# Patient Record
Sex: FEMALE | Race: WHITE | NOT HISPANIC OR LATINO | URBAN - METROPOLITAN AREA
[De-identification: names, ages, dates, MRNs, and addresses within clinical notes are randomized per-mention and may not be internally consistent; named-entity substitution may affect disease eponyms.]

---

## 2019-08-06 ENCOUNTER — OUTPATIENT (OUTPATIENT)
Dept: OUTPATIENT SERVICES | Facility: HOSPITAL | Age: 29
LOS: 1 days | End: 2019-08-06

## 2019-08-06 VITALS
RESPIRATION RATE: 16 BRPM | SYSTOLIC BLOOD PRESSURE: 110 MMHG | TEMPERATURE: 97 F | HEART RATE: 64 BPM | WEIGHT: 182.1 LBS | HEIGHT: 63 IN | DIASTOLIC BLOOD PRESSURE: 70 MMHG

## 2019-08-06 DIAGNOSIS — Z98.891 HISTORY OF UTERINE SCAR FROM PREVIOUS SURGERY: ICD-10-CM

## 2019-08-06 DIAGNOSIS — Z01.818 ENCOUNTER FOR OTHER PREPROCEDURAL EXAMINATION: ICD-10-CM

## 2019-08-06 DIAGNOSIS — O34.219 MATERNAL CARE FOR UNSPECIFIED TYPE SCAR FROM PREVIOUS CESAREAN DELIVERY: ICD-10-CM

## 2019-08-06 DIAGNOSIS — Z98.891 HISTORY OF UTERINE SCAR FROM PREVIOUS SURGERY: Chronic | ICD-10-CM

## 2019-08-06 LAB
APPEARANCE UR: SIGNIFICANT CHANGE UP
BACTERIA # UR AUTO: HIGH
BILIRUB UR-MCNC: NEGATIVE — SIGNIFICANT CHANGE UP
BLOOD UR QL VISUAL: NEGATIVE — SIGNIFICANT CHANGE UP
COLOR SPEC: YELLOW — SIGNIFICANT CHANGE UP
GLUCOSE UR-MCNC: NEGATIVE — SIGNIFICANT CHANGE UP
HCT VFR BLD CALC: 35.2 % — SIGNIFICANT CHANGE UP (ref 34.5–45)
HGB BLD-MCNC: 11.4 G/DL — LOW (ref 11.5–15.5)
HYALINE CASTS # UR AUTO: NEGATIVE — SIGNIFICANT CHANGE UP
KETONES UR-MCNC: NEGATIVE — SIGNIFICANT CHANGE UP
LEUKOCYTE ESTERASE UR-ACNC: NEGATIVE — SIGNIFICANT CHANGE UP
MCHC RBC-ENTMCNC: 29.7 PG — SIGNIFICANT CHANGE UP (ref 27–34)
MCHC RBC-ENTMCNC: 32.4 % — SIGNIFICANT CHANGE UP (ref 32–36)
MCV RBC AUTO: 91.7 FL — SIGNIFICANT CHANGE UP (ref 80–100)
NITRITE UR-MCNC: NEGATIVE — SIGNIFICANT CHANGE UP
NRBC # FLD: 0 K/UL — SIGNIFICANT CHANGE UP (ref 0–0)
PH UR: 6.5 — SIGNIFICANT CHANGE UP (ref 5–8)
PLATELET # BLD AUTO: 143 K/UL — LOW (ref 150–400)
PMV BLD: 11.7 FL — SIGNIFICANT CHANGE UP (ref 7–13)
PROT UR-MCNC: 20 — SIGNIFICANT CHANGE UP
RBC # BLD: 3.84 M/UL — SIGNIFICANT CHANGE UP (ref 3.8–5.2)
RBC # FLD: 13.5 % — SIGNIFICANT CHANGE UP (ref 10.3–14.5)
RBC CASTS # UR COMP ASSIST: SIGNIFICANT CHANGE UP (ref 0–?)
SP GR SPEC: 1.02 — SIGNIFICANT CHANGE UP (ref 1–1.04)
SQUAMOUS # UR AUTO: SIGNIFICANT CHANGE UP
UROBILINOGEN FLD QL: NORMAL — SIGNIFICANT CHANGE UP
WBC # BLD: 8.74 K/UL — SIGNIFICANT CHANGE UP (ref 3.8–10.5)
WBC # FLD AUTO: 8.74 K/UL — SIGNIFICANT CHANGE UP (ref 3.8–10.5)
WBC UR QL: SIGNIFICANT CHANGE UP (ref 0–?)

## 2019-08-06 NOTE — OB PST NOTE - HISTORY OF PRESENT ILLNESS
30 y/o female , last 2 deliveries where by . The first  was performed due to fetal heart decelerations  during labor. The 2nd  was performed as pt did not dilate.  Now with EDC 19, scheduled for Repeat  19. 30 y/o female , last 2 deliveries where by . The first  was performed due to fetal heart decelerations  during labor. The 2nd  was performed as pt did not dilate.  Now with EDC 19, scheduled for Repeat  19.     Pt reports she feels baby moving as usual today

## 2019-08-06 NOTE — OB PST NOTE - NSHPREVIEWOFSYSTEMS_GEN_ALL_CORE

## 2019-08-06 NOTE — OB PST NOTE - ASSESSMENT
30 y/o female , last 2 deliveries where by . The first  was performed due to fetal heart decelerations  during labor. The 2nd  was performed as pt did not dilate.  Now with EDC 19, scheduled for Repeat  19.

## 2019-08-06 NOTE — OB PST NOTE - PROBLEM SELECTOR PLAN 1
Repeat  19.     CBC RPR T&S UA    Preop instructions and antibacterial soap given and explained (verbal and written), with teach back.

## 2019-08-07 ENCOUNTER — TRANSCRIPTION ENCOUNTER (OUTPATIENT)
Age: 29
End: 2019-08-07

## 2019-08-07 LAB — T PALLIDUM AB TITR SER: NEGATIVE — SIGNIFICANT CHANGE UP

## 2019-08-08 ENCOUNTER — INPATIENT (INPATIENT)
Facility: HOSPITAL | Age: 29
LOS: 2 days | Discharge: HOME | End: 2019-08-11
Attending: OBSTETRICS & GYNECOLOGY | Admitting: OBSTETRICS & GYNECOLOGY
Payer: COMMERCIAL

## 2019-08-08 ENCOUNTER — TRANSCRIPTION ENCOUNTER (OUTPATIENT)
Age: 29
End: 2019-08-08

## 2019-08-08 VITALS
DIASTOLIC BLOOD PRESSURE: 57 MMHG | HEART RATE: 66 BPM | RESPIRATION RATE: 17 BRPM | SYSTOLIC BLOOD PRESSURE: 107 MMHG | TEMPERATURE: 98 F

## 2019-08-08 DIAGNOSIS — Z98.891 HISTORY OF UTERINE SCAR FROM PREVIOUS SURGERY: Chronic | ICD-10-CM

## 2019-08-08 DIAGNOSIS — Z3A.00 WEEKS OF GESTATION OF PREGNANCY NOT SPECIFIED: ICD-10-CM

## 2019-08-08 DIAGNOSIS — O26.899 OTHER SPECIFIED PREGNANCY RELATED CONDITIONS, UNSPECIFIED TRIMESTER: ICD-10-CM

## 2019-08-08 LAB
BASOPHILS # BLD AUTO: 0.02 K/UL — SIGNIFICANT CHANGE UP (ref 0–0.2)
BASOPHILS NFR BLD AUTO: 0.2 % — SIGNIFICANT CHANGE UP (ref 0–2)
BLD GP AB SCN SERPL QL: NEGATIVE — SIGNIFICANT CHANGE UP
EOSINOPHIL # BLD AUTO: 0.07 K/UL — SIGNIFICANT CHANGE UP (ref 0–0.5)
EOSINOPHIL NFR BLD AUTO: 0.8 % — SIGNIFICANT CHANGE UP (ref 0–6)
HCT VFR BLD CALC: 34.2 % — LOW (ref 34.5–45)
HGB BLD-MCNC: 11.2 G/DL — LOW (ref 11.5–15.5)
IMM GRANULOCYTES NFR BLD AUTO: 0.5 % — SIGNIFICANT CHANGE UP (ref 0–1.5)
LYMPHOCYTES # BLD AUTO: 1.67 K/UL — SIGNIFICANT CHANGE UP (ref 1–3.3)
LYMPHOCYTES # BLD AUTO: 18.1 % — SIGNIFICANT CHANGE UP (ref 13–44)
MCHC RBC-ENTMCNC: 29.9 PG — SIGNIFICANT CHANGE UP (ref 27–34)
MCHC RBC-ENTMCNC: 32.7 % — SIGNIFICANT CHANGE UP (ref 32–36)
MCV RBC AUTO: 91.2 FL — SIGNIFICANT CHANGE UP (ref 80–100)
MONOCYTES # BLD AUTO: 0.65 K/UL — SIGNIFICANT CHANGE UP (ref 0–0.9)
MONOCYTES NFR BLD AUTO: 7 % — SIGNIFICANT CHANGE UP (ref 2–14)
NEUTROPHILS # BLD AUTO: 6.77 K/UL — SIGNIFICANT CHANGE UP (ref 1.8–7.4)
NEUTROPHILS NFR BLD AUTO: 73.4 % — SIGNIFICANT CHANGE UP (ref 43–77)
NRBC # FLD: 0 K/UL — SIGNIFICANT CHANGE UP (ref 0–0)
PLATELET # BLD AUTO: 148 K/UL — LOW (ref 150–400)
PMV BLD: 11.6 FL — SIGNIFICANT CHANGE UP (ref 7–13)
RBC # BLD: 3.75 M/UL — LOW (ref 3.8–5.2)
RBC # FLD: 13.3 % — SIGNIFICANT CHANGE UP (ref 10.3–14.5)
RH IG SCN BLD-IMP: POSITIVE — SIGNIFICANT CHANGE UP
RH IG SCN BLD-IMP: POSITIVE — SIGNIFICANT CHANGE UP
WBC # BLD: 9.23 K/UL — SIGNIFICANT CHANGE UP (ref 3.8–10.5)
WBC # FLD AUTO: 9.23 K/UL — SIGNIFICANT CHANGE UP (ref 3.8–10.5)

## 2019-08-08 PROCEDURE — 59514 CESAREAN DELIVERY ONLY: CPT | Mod: U7

## 2019-08-08 RX ORDER — NALOXONE HYDROCHLORIDE 4 MG/.1ML
0.1 SPRAY NASAL
Refills: 0 | Status: DISCONTINUED | OUTPATIENT
Start: 2019-08-09 | End: 2019-08-10

## 2019-08-08 RX ORDER — SODIUM CHLORIDE 9 MG/ML
1000 INJECTION, SOLUTION INTRAVENOUS
Refills: 0 | Status: DISCONTINUED | OUTPATIENT
Start: 2019-08-08 | End: 2019-08-08

## 2019-08-08 RX ORDER — OXYCODONE HYDROCHLORIDE 5 MG/1
5 TABLET ORAL
Refills: 0 | Status: DISCONTINUED | OUTPATIENT
Start: 2019-08-09 | End: 2019-08-10

## 2019-08-08 RX ORDER — CITRIC ACID/SODIUM CITRATE 300-500 MG
30 SOLUTION, ORAL ORAL ONCE
Refills: 0 | Status: COMPLETED | OUTPATIENT
Start: 2019-08-08 | End: 2019-08-08

## 2019-08-08 RX ORDER — OXYTOCIN 10 UNIT/ML
333.33 VIAL (ML) INJECTION
Qty: 20 | Refills: 0 | Status: DISCONTINUED | OUTPATIENT
Start: 2019-08-08 | End: 2019-08-08

## 2019-08-08 RX ORDER — BUTORPHANOL TARTRATE 2 MG/ML
0.12 INJECTION, SOLUTION INTRAMUSCULAR; INTRAVENOUS EVERY 6 HOURS
Refills: 0 | Status: DISCONTINUED | OUTPATIENT
Start: 2019-08-09 | End: 2019-08-10

## 2019-08-08 RX ORDER — TRANEXAMIC ACID 100 MG/ML
1000 INJECTION, SOLUTION INTRAVENOUS ONCE
Refills: 0 | Status: COMPLETED | OUTPATIENT
Start: 2019-08-08 | End: 2019-08-08

## 2019-08-08 RX ORDER — ONDANSETRON 8 MG/1
4 TABLET, FILM COATED ORAL EVERY 6 HOURS
Refills: 0 | Status: DISCONTINUED | OUTPATIENT
Start: 2019-08-09 | End: 2019-08-10

## 2019-08-08 RX ORDER — METOCLOPRAMIDE HCL 10 MG
10 TABLET ORAL ONCE
Refills: 0 | Status: COMPLETED | OUTPATIENT
Start: 2019-08-08 | End: 2019-08-08

## 2019-08-08 RX ORDER — OXYCODONE HYDROCHLORIDE 5 MG/1
10 TABLET ORAL
Refills: 0 | Status: DISCONTINUED | OUTPATIENT
Start: 2019-08-09 | End: 2019-08-10

## 2019-08-08 RX ORDER — HYDROMORPHONE HYDROCHLORIDE 2 MG/ML
1 INJECTION INTRAMUSCULAR; INTRAVENOUS; SUBCUTANEOUS ONCE
Refills: 0 | Status: DISCONTINUED | OUTPATIENT
Start: 2019-08-09 | End: 2019-08-10

## 2019-08-08 RX ORDER — FAMOTIDINE 10 MG/ML
20 INJECTION INTRAVENOUS ONCE
Refills: 0 | Status: COMPLETED | OUTPATIENT
Start: 2019-08-08 | End: 2019-08-08

## 2019-08-08 RX ORDER — SODIUM CHLORIDE 9 MG/ML
1000 INJECTION, SOLUTION INTRAVENOUS ONCE
Refills: 0 | Status: COMPLETED | OUTPATIENT
Start: 2019-08-08 | End: 2019-08-08

## 2019-08-08 RX ADMIN — Medication 10 MILLIGRAM(S): at 22:00

## 2019-08-08 RX ADMIN — SODIUM CHLORIDE 2000 MILLILITER(S): 9 INJECTION, SOLUTION INTRAVENOUS at 21:30

## 2019-08-08 RX ADMIN — FAMOTIDINE 20 MILLIGRAM(S): 10 INJECTION INTRAVENOUS at 22:00

## 2019-08-08 RX ADMIN — TRANEXAMIC ACID 220 MILLIGRAM(S): 100 INJECTION, SOLUTION INTRAVENOUS at 22:11

## 2019-08-08 RX ADMIN — Medication 30 MILLILITER(S): at 22:00

## 2019-08-08 NOTE — OB PROVIDER TRIAGE NOTE - HISTORY OF PRESENT ILLNESS
Pt of Dr. العلي: 30yo  at 38.5wks, presents to triage from Dr. العلي's office due to oligohydramnios; for admission. Plan was for cervical martel in order for patient to TOLAC but pt now desires a repeat c section. Pt denies any leakage of fluid or vaginal bleeding. Reports good fetal movement.Pt last ate at 2pm    Allergies: denies  Meds: PNV (stopped yesterday)  PMH: denies  PSH: C/S x 2  OBGYN  2010, , FT, 6#2  2012, , FT, 6#7  2013, C/S due to NRFHRT, FT, 5#2  2016, failed TOLAC, repeat C section, FT, 6#2  Current AP complications: denies

## 2019-08-08 NOTE — OB PROVIDER TRIAGE NOTE - NS_OBGYNHISTORY_OBGYN_ALL_OB_FT
OBYAZN  2010, , FT, 6#2  2012, , FT, 6#7  2013, C/S due to NRFHRT, FT, 5#2  2016, failed TOLAC, repeat C section, FT, 6#2  Current AP complications: denies

## 2019-08-08 NOTE — OB PROVIDER H&P - HISTORY OF PRESENT ILLNESS
Pt of Dr. العلي: 28yo  at 38.5wks, presents to triage from Dr. العلي's office due to oligohydramnios; for admission. Plan was for cervical martel in order for patient to TOLAC but pt now desires a repeat c section. Pt denies any leakage of fluid or vaginal bleeding. Reports good fetal movement.Pt last ate at 2pm    Allergies: denies  Meds: PNV (stopped yesterday)  PMH: denies  PSH: C/S x 2  OBGYN  2010, , FT, 6#2  2012, , FT, 6#7  2013, C/S due to NRFHRT, FT, 5#2  2016, failed TOLAC, repeat C section, FT, 6#2  Current AP complications: denies

## 2019-08-08 NOTE — OB PROVIDER TRIAGE NOTE - NSHPPHYSICALEXAM_GEN_ALL_CORE
VS: /57, HR 66, T 36.5  Abd soft, nontender, gravid  SSE: negative pooling/nitrazine/ferning  SVE: 1.5/60/-3  TAS: anterior placenta, vertex, NEELAM=0, BPP=6/8 (no fluid), EFW 3000g  NST: , moderate variability, accels, mild variable noted  Ctx occasional on toco

## 2019-08-08 NOTE — OB PROVIDER H&P - ASSESSMENT
Pt of Dr. العلي: 28yo  at 38.5wks, presents to triage from Dr. العلي's office due to oligohydramnios; for admission. Plan was for cervical martel in order for patient to TOLAC but pt now desires a repeat c section. Pt denies any leakage of fluid or vaginal bleeding. Reports good fetal movement.Pt last ate at 2pm    Allergies: denies  Meds: PNV (stopped yesterday)  PMH: denies  PSH: C/S x 2  OBGYN  2010, , FT, 6#2  2012, , FT, 6#7  2013, C/S due to NRFHRT, FT, 5#2  2016, failed TOLAC, repeat C section, FT, 6#2  Current AP complications: denies    Assessment/Plan    VS: /57, HR 66, T 36.5  Abd soft, nontender, gravid  SSE: negative pooling/nitrazine/ferning  SVE: 1.5/60/-3  TAS: anterior placenta, vertex, NEELAM=0, BPP=6/8 (no fluid), EFW 3000g  NST: , moderate variability, accels, mild variable noted  Ctx occasional on toco    -Pt desires TOLAC  -D/w Dr. Camarillo  -Routine labs  -Preop  -Huddle with anesthesia, nursing staff and OBattending Pt of Dr. العلي: 30yo  at 38.5wks, presents to triage from Dr. العلي's office due to oligohydramnios; for admission. Plan was for cervical martel in order for patient to TOLAC but pt now desires a repeat c section. Pt denies any leakage of fluid or vaginal bleeding. Reports good fetal movement.Pt last ate at 2pm    Allergies: denies  Meds: PNV (stopped yesterday)  PMH: denies  PSH: C/S x 2  OBGYN  2010, , FT, 6#2  2012, , FT, 6#7  2013, C/S due to NRFHRT, FT, 5#2  2016, failed TOLAC, repeat C section, FT, 6#2  Current AP complications: denies    Assessment/Plan    VS: /57, HR 66, T 36.5  Abd soft, nontender, gravid  SSE: negative pooling/nitrazine/ferning  SVE: 1.5/60/-3  TAS: anterior placenta, vertex, NEELAM=0, BPP=6/8 (no fluid), EFW 3000g  NST: , moderate variability, accels, mild variable noted  Ctx occasional on toco    -Pt desires TOLAC  -D/w Dr. Camarillo  -Routine labs  -Preop  -Huddle held during L&D safety rounds with anesthesia, charge RN and Dr. Camarillo; plan is for rLTCS at 10pm

## 2019-08-08 NOTE — OB PROVIDER TRIAGE NOTE - NSOBPROVIDERNOTE_OBGYN_ALL_OB_FT
Pt of Dr. العلي: 30yo  at 38.5wks, presents to triage from Dr. العلي's office due to oligohydramnios; for admission. Plan was for cervical martel in order for patient to TOLAC but pt now desires a repeat c section. Pt denies any leakage of fluid or vaginal bleeding. Reports good fetal movement.Pt last ate at 2pm    Allergies: denies  Meds: PNV (stopped yesterday)  PMH: denies  PSH: C/S x 2  OBGYN  2010, , FT, 6#2  2012, , FT, 6#7  2013, C/S due to NRFHRT, FT, 5#2  2016, failed TOLAC, repeat C section, FT, 6#2  Current AP complications: denies    Assessment/Plan    VS: /57, HR 66, T 36.5  Abd soft, nontender, gravid  SSE: negative pooling/nitrazine/ferning  SVE: 1.5/60/-3  TAS: anterior placenta, vertex, NEELAM=0, BPP=6/8 (no fluid), EFW 3000g  NST: , moderate variability, accels, mild variable noted  Ctx occasional on toco    -Pt desires TOLAC  -D/w Dr. العلي and Dr. Camarillo  -Routine labs  -Preop  -Huddle to be held with anesthesia, nursing staff and OB attending

## 2019-08-08 NOTE — OB NEONATOLOGY/PEDIATRICIAN DELIVERY SUMMARY - NSPEDSNEONOTESA_OBGYN_ALL_OB_FT
38.5 wk male born to a 30 y/o  mother via repeat CS. No significant maternal hx. Prenatal hx significant for oligohydramnios. Maternal blood type AB+. Prenatal labs negative, non-reactive and immune. GBS negative on . ROM at time of delivery with scant fluids. Baby was born vigorous and crying spontaneously. W/D/S/S. APGARS 9/9. EOS not calculated (no ROM, no labor.)    Mom is planning on bottle feeding, consents to hep B vaccination. No circumcision.    Physical Exam:  Skin: WWP, pink  Head: NCAT, AFOF, no dysmorphic features  Ears: no pits or tags, no deformity  Nose: nares patent  Mouth: no cleft, palate intact  Trunk: No crepitus, lungs CTAB with normal work of breathing  Cardiac: S1S2 regular rate, no murmur  Abdomen: Soft, nontender, not distended, no masses  Umbilical cord: 3 vessel  Extremities: FROM, negative ortolani/okeefe bilaterally  Spine/anus: No sacral dimple, anus patent  Genitalia: normal male external genitalia, testicles descended bilaterally.  Neuro: +grasp +yana +suck 38.5 wk male born to a 28 y/o  mother via repeat CS. No significant maternal hx. Prenatal hx significant for oligohydramnios. Maternal blood type AB+. Prenatal labs negative, non-reactive and immune. GBS negative on . ROM at time of delivery with scant fluids. Baby was born vigorous and crying spontaneously. W/D/S/S. APGARS 9/9. EOS not calculated (no ROM, no labor.)    Mom is planning on bottle feeding, consents to hep B vaccination. No circumcision.    Physical Exam:  Skin: WWP, pink  Head: NCAT, AFOF, no dysmorphic features  Ears: no pits or tags, no deformity  Nose: nares patent  Mouth: no cleft, palate intact  Trunk: No crepitus, lungs CTAB with normal work of breathing  Cardiac: S1S2 regular rate, no murmur  Abdomen: Soft, nontender, not distended, no masses  Umbilical cord: 3 vessel  Extremities: FROM, negative ortolani/okeefe bilaterally  Spine/anus: anus patent  Genitalia: normal male external genitalia, testicles descended bilaterally.  Neuro: +grasp +yana +suck

## 2019-08-09 LAB
HCT VFR BLD CALC: 33.1 % — LOW (ref 34.5–45)
HGB BLD-MCNC: 11 G/DL — LOW (ref 11.5–15.5)
MCHC RBC-ENTMCNC: 30.8 PG — SIGNIFICANT CHANGE UP (ref 27–34)
MCHC RBC-ENTMCNC: 33.2 % — SIGNIFICANT CHANGE UP (ref 32–36)
MCV RBC AUTO: 92.7 FL — SIGNIFICANT CHANGE UP (ref 80–100)
NRBC # FLD: 0 K/UL — SIGNIFICANT CHANGE UP (ref 0–0)
PLATELET # BLD AUTO: 130 K/UL — LOW (ref 150–400)
PMV BLD: 12.2 FL — SIGNIFICANT CHANGE UP (ref 7–13)
RBC # BLD: 3.57 M/UL — LOW (ref 3.8–5.2)
RBC # FLD: 13.3 % — SIGNIFICANT CHANGE UP (ref 10.3–14.5)
T PALLIDUM AB TITR SER: NEGATIVE — SIGNIFICANT CHANGE UP
WBC # BLD: 14.2 K/UL — HIGH (ref 3.8–10.5)
WBC # FLD AUTO: 14.2 K/UL — HIGH (ref 3.8–10.5)

## 2019-08-09 RX ORDER — LANOLIN
1 OINTMENT (GRAM) TOPICAL EVERY 6 HOURS
Refills: 0 | Status: DISCONTINUED | OUTPATIENT
Start: 2019-08-09 | End: 2019-08-11

## 2019-08-09 RX ORDER — OXYCODONE HYDROCHLORIDE 5 MG/1
5 TABLET ORAL
Refills: 0 | Status: DISCONTINUED | OUTPATIENT
Start: 2019-08-09 | End: 2019-08-11

## 2019-08-09 RX ORDER — ACETAMINOPHEN 500 MG
975 TABLET ORAL
Refills: 0 | Status: DISCONTINUED | OUTPATIENT
Start: 2019-08-09 | End: 2019-08-11

## 2019-08-09 RX ORDER — DIPHENHYDRAMINE HCL 50 MG
25 CAPSULE ORAL EVERY 6 HOURS
Refills: 0 | Status: DISCONTINUED | OUTPATIENT
Start: 2019-08-09 | End: 2019-08-11

## 2019-08-09 RX ORDER — OXYCODONE HYDROCHLORIDE 5 MG/1
5 TABLET ORAL ONCE
Refills: 0 | Status: DISCONTINUED | OUTPATIENT
Start: 2019-08-09 | End: 2019-08-11

## 2019-08-09 RX ORDER — FERROUS SULFATE 325(65) MG
325 TABLET ORAL DAILY
Refills: 0 | Status: DISCONTINUED | OUTPATIENT
Start: 2019-08-09 | End: 2019-08-11

## 2019-08-09 RX ORDER — IBUPROFEN 200 MG
600 TABLET ORAL EVERY 6 HOURS
Refills: 0 | Status: COMPLETED | OUTPATIENT
Start: 2019-08-09 | End: 2020-07-07

## 2019-08-09 RX ORDER — OXYTOCIN 10 UNIT/ML
333.33 VIAL (ML) INJECTION
Qty: 20 | Refills: 0 | Status: DISCONTINUED | OUTPATIENT
Start: 2019-08-09 | End: 2019-08-10

## 2019-08-09 RX ORDER — SODIUM CHLORIDE 9 MG/ML
1000 INJECTION, SOLUTION INTRAVENOUS
Refills: 0 | Status: DISCONTINUED | OUTPATIENT
Start: 2019-08-09 | End: 2019-08-09

## 2019-08-09 RX ORDER — GLYCERIN ADULT
1 SUPPOSITORY, RECTAL RECTAL AT BEDTIME
Refills: 0 | Status: DISCONTINUED | OUTPATIENT
Start: 2019-08-09 | End: 2019-08-11

## 2019-08-09 RX ORDER — KETOROLAC TROMETHAMINE 30 MG/ML
30 SYRINGE (ML) INJECTION EVERY 6 HOURS
Refills: 0 | Status: DISCONTINUED | OUTPATIENT
Start: 2019-08-09 | End: 2019-08-10

## 2019-08-09 RX ORDER — HEPARIN SODIUM 5000 [USP'U]/ML
5000 INJECTION INTRAVENOUS; SUBCUTANEOUS EVERY 12 HOURS
Refills: 0 | Status: DISCONTINUED | OUTPATIENT
Start: 2019-08-09 | End: 2019-08-11

## 2019-08-09 RX ORDER — MAGNESIUM HYDROXIDE 400 MG/1
30 TABLET, CHEWABLE ORAL
Refills: 0 | Status: DISCONTINUED | OUTPATIENT
Start: 2019-08-09 | End: 2019-08-11

## 2019-08-09 RX ORDER — TETANUS TOXOID, REDUCED DIPHTHERIA TOXOID AND ACELLULAR PERTUSSIS VACCINE, ADSORBED 5; 2.5; 8; 8; 2.5 [IU]/.5ML; [IU]/.5ML; UG/.5ML; UG/.5ML; UG/.5ML
0.5 SUSPENSION INTRAMUSCULAR ONCE
Refills: 0 | Status: DISCONTINUED | OUTPATIENT
Start: 2019-08-09 | End: 2019-08-11

## 2019-08-09 RX ORDER — SIMETHICONE 80 MG/1
80 TABLET, CHEWABLE ORAL EVERY 4 HOURS
Refills: 0 | Status: DISCONTINUED | OUTPATIENT
Start: 2019-08-09 | End: 2019-08-11

## 2019-08-09 RX ORDER — DOCUSATE SODIUM 100 MG
100 CAPSULE ORAL
Refills: 0 | Status: DISCONTINUED | OUTPATIENT
Start: 2019-08-09 | End: 2019-08-11

## 2019-08-09 RX ADMIN — SIMETHICONE 80 MILLIGRAM(S): 80 TABLET, CHEWABLE ORAL at 18:25

## 2019-08-09 RX ADMIN — Medication 975 MILLIGRAM(S): at 23:30

## 2019-08-09 RX ADMIN — Medication 100 MILLIGRAM(S): at 13:46

## 2019-08-09 RX ADMIN — Medication 1 TABLET(S): at 13:47

## 2019-08-09 RX ADMIN — Medication 975 MILLIGRAM(S): at 13:46

## 2019-08-09 RX ADMIN — Medication 30 MILLIGRAM(S): at 18:18

## 2019-08-09 RX ADMIN — Medication 1000 MILLIUNIT(S)/MIN: at 00:15

## 2019-08-09 RX ADMIN — SODIUM CHLORIDE 75 MILLILITER(S): 9 INJECTION, SOLUTION INTRAVENOUS at 00:15

## 2019-08-09 RX ADMIN — Medication 100 MILLIGRAM(S): at 23:30

## 2019-08-09 RX ADMIN — Medication 975 MILLIGRAM(S): at 14:30

## 2019-08-09 RX ADMIN — HEPARIN SODIUM 5000 UNIT(S): 5000 INJECTION INTRAVENOUS; SUBCUTANEOUS at 05:49

## 2019-08-09 RX ADMIN — Medication 325 MILLIGRAM(S): at 15:58

## 2019-08-09 RX ADMIN — Medication 30 MILLIGRAM(S): at 19:00

## 2019-08-09 RX ADMIN — SIMETHICONE 80 MILLIGRAM(S): 80 TABLET, CHEWABLE ORAL at 13:46

## 2019-08-09 RX ADMIN — HEPARIN SODIUM 5000 UNIT(S): 5000 INJECTION INTRAVENOUS; SUBCUTANEOUS at 18:18

## 2019-08-09 NOTE — DISCHARGE NOTE OB - HOSPITAL COURSE
Pt admitted for oligohydramnios. Previous  section desiring repeat  section. Repeat low flap transverse  section 19 - viable male infant

## 2019-08-09 NOTE — DISCHARGE NOTE OB - CARE PROVIDER_API CALL
Shakeel العلي)  MaternalFetal Medicine; Obstetrics and Gynecology  [address] [address] [place] [address], Room [address]  [place] [address]  Phone: [phone]  Fax: [phone]  Follow Up Time:

## 2019-08-09 NOTE — DISCHARGE NOTE OB - CARE PLAN
Principal Discharge DX:	 delivery delivered  Goal:	recovery  Assessment and plan of treatment:	no heavy lifting  follow up 2 weeks

## 2019-08-09 NOTE — PROGRESS NOTE ADULT - SUBJECTIVE AND OBJECTIVE BOX
OB Progress Note:  Delivery, POD#1    S: 28yo POD#1 s/p LTCS . Pain well controlled, tolerating regular diet, passing flatus, ambulating without difficulty, voiding spontaneously. Denies heavy vaginal bleeding, N/V, CP/SOB/lightheadedness/dizziness.     O:   Vital Signs Last 24 Hrs  T(C): 36.8 (09 Aug 2019 06:26), Max: 36.8 (08 Aug 2019 20:50)  T(F): 98.3 (09 Aug 2019 06:26), Max: 98.3 (09 Aug 2019 06:26)  HR: 75 (09 Aug 2019 06:26) (60 - 75)  BP: 95/53 (09 Aug 2019 06:26) (63/31 - 117/75)  BP(mean): 81 (09 Aug 2019 02:30) (37 - 96)  RR: 18 (09 Aug 2019 06:26) (12 - 18)  SpO2: 99% (09 Aug 2019 06:26) (99% - 100%)    Labs:  Blood type: AB Positive  Rubella IgG: RPR: Negative                          11.2<L>   9.23 >-----------< 148<L>    (  @ 21:00 )             34.2<L>                        11.4<L>   8.74 >-----------< 143<L>    (  @ 17:15 )             35.2                  PE:  General: NAD  Abdomen: Mildly distended, appropriately tender, Fundus firm, incision c/d/i.  VE: No heavy vaginal bleeding  Extremities: No erythema, no pitting edema

## 2019-08-09 NOTE — DISCHARGE NOTE OB - PATIENT PORTAL LINK FT
You can access the [facility] Portal, offered by [facility], by registering with the following website: [email or web address]

## 2019-08-09 NOTE — OB PROVIDER DELIVERY SUMMARY - NSPROVIDERDELIVERYNOTE_OBGYN_ALL_OB_FT
Normal apearing uterus, tubes and ovaries  viable male infant  thin omental adhesion Normal apearing uterus, tubes and ovaries. Viable male infant vtx presentation. Apgars 9/9   viable male infant  thin omental adhesion Normal apearing uterus, tubes and ovaries. Viable male infant vtx presentation. Apgars 9/9 2740 g  viable male infant  thin omental adhesion

## 2019-08-09 NOTE — PROGRESS NOTE ADULT - ASSESSMENT
A/P: 28yo POD#1 s/p LTCS.  Patient is stable and doing well post-operatively.    - Continue regular diet  - Increase ambulation  - Continue motrin, tylenol, oxycodone PRN for pain control.   - AM CBC appropriate    Sushila Mary, PGY-1

## 2019-08-10 DIAGNOSIS — O41.00X0 OLIGOHYDRAMNIOS, UNSPECIFIED TRIMESTER, NOT APPLICABLE OR UNSPECIFIED: ICD-10-CM

## 2019-08-10 RX ORDER — IBUPROFEN 200 MG
600 TABLET ORAL EVERY 6 HOURS
Refills: 0 | Status: DISCONTINUED | OUTPATIENT
Start: 2019-08-10 | End: 2019-08-11

## 2019-08-10 RX ORDER — IBUPROFEN 200 MG
1 TABLET ORAL
Qty: 0 | Refills: 0 | DISCHARGE
Start: 2019-08-10

## 2019-08-10 RX ADMIN — HEPARIN SODIUM 5000 UNIT(S): 5000 INJECTION INTRAVENOUS; SUBCUTANEOUS at 17:50

## 2019-08-10 RX ADMIN — Medication 975 MILLIGRAM(S): at 19:52

## 2019-08-10 RX ADMIN — Medication 600 MILLIGRAM(S): at 09:17

## 2019-08-10 RX ADMIN — Medication 975 MILLIGRAM(S): at 20:30

## 2019-08-10 RX ADMIN — Medication 600 MILLIGRAM(S): at 17:50

## 2019-08-10 RX ADMIN — Medication 600 MILLIGRAM(S): at 23:35

## 2019-08-10 RX ADMIN — HEPARIN SODIUM 5000 UNIT(S): 5000 INJECTION INTRAVENOUS; SUBCUTANEOUS at 06:38

## 2019-08-10 RX ADMIN — Medication 325 MILLIGRAM(S): at 12:13

## 2019-08-10 RX ADMIN — Medication 600 MILLIGRAM(S): at 10:00

## 2019-08-10 RX ADMIN — Medication 600 MILLIGRAM(S): at 18:29

## 2019-08-10 RX ADMIN — Medication 975 MILLIGRAM(S): at 00:02

## 2019-08-10 NOTE — PROGRESS NOTE ADULT - SUBJECTIVE AND OBJECTIVE BOX
SUBJECTIVE:  Pain: well controlled  Complaints: none    MILESTONES:  Alert and oriented x 3  [ x ]  Out of bed/ ambulating. [ x ]  Flatus: [ x ]  Postive [  ] Negative   Bowel movement  [  ] Positive [ x ] Negative   Voiding [x  ] Due to void [  ]   Diet: Regular [ x ]  Clears [  ]  NPO [  ]  Infant feeding:  Breast [x  ]   Bottle [  ]  Both [  ]  Feeding related inssues and/or concerns: none      OBJECTIVE:  T(C): 36.6 (08-10-19 @ 06:19), Max: 37.5 (19 @ 14:26)  HR: 64 (08-10-19 @ 06:19) (64 - 75)  BP: 92/51 (08-10-19 @ 06:19) (92/51 - 128/68)  RR: 18 (08-10-19 @ 06:19) (18 - 18)  SpO2: 98% (08-10-19 @ 06:19) (97% - 100%)  Wt(kg): --                        11.0   14.20 )-----------( 130      ( 09 Aug 2019 17:35 )             33.1     MEDICATIONS  (STANDING):  acetaminophen   Tablet .. 975 milliGRAM(s) Oral <User Schedule>  diphtheria/tetanus/pertussis (acellular) Vaccine (ADAcel) 0.5 milliLiter(s) IntraMuscular once  ferrous    sulfate 325 milliGRAM(s) Oral daily  heparin  Injectable 5000 Unit(s) SubCutaneous every 12 hours  ibuprofen  Tablet. 600 milliGRAM(s) Oral every 6 hours  prenatal multivitamin 1 Tablet(s) Oral daily    MEDICATIONS  (PRN):  diphenhydrAMINE 25 milliGRAM(s) Oral every 6 hours PRN Itching  docusate sodium 100 milliGRAM(s) Oral two times a day PRN Stool softening  glycerin Suppository - Adult 1 Suppository(s) Rectal at bedtime PRN Constipation  lanolin Ointment 1 Application(s) Topical every 6 hours PRN Sore Nipples  magnesium hydroxide Suspension 30 milliLiter(s) Oral two times a day PRN Constipation  oxyCODONE    IR 5 milliGRAM(s) Oral every 3 hours PRN Moderate to Severe Pain (4-10)  oxyCODONE    IR 5 milliGRAM(s) Oral once PRN Moderate to Severe Pain (4-10)  simethicone 80 milliGRAM(s) Chew every 4 hours PRN Gas    ASSESSMENT:  29y  y/o G 5  P 5   PO Day#  2      Delivery: Primary [  ]    Repeat [ x ]                                       Indication of procedure: previous c/s  Condition: Stable  Past Medical History significant for: HPI:  Pt of Dr. العلي: 30yo  at 38.5wks, presents to triage from Dr. العلي's office due to oligohydramnios; for admission. Plan was for cervical martel in order for patient to TOLAC but pt now desires a repeat c section. Pt denies any leakage of fluid or vaginal bleeding. Reports good fetal movement.Pt last ate at 2pm  Allergies: denies  Meds: PNV (stopped yesterday)  PMH: denies  PSH: C/S x 2  OBGYN  2010, , FT, 6#2  2012, , FT, 6#7  2013, C/S due to NRFHRT, FT, 5#2  2016, failed TOLAC, repeat C section, FT, 6#2  Current AP complications: denies (08 Aug 2019 20:35)    Current Issues:none  Heart:             RRR                 Lungs: clear  Breasts:  Soft [x  ]   Engorged [  ]  Abdomen: Soft [x  ] , distended [  ] nontender [x  ]   Bowel sounds :  Present [ x ]  Absent [  ]   Fundus firm [x  ]  Boggy [  ]  Abdominal incision: Clean, dry and intact [x  ]  Staples [  ] Steri Strips [ x ] Dermabond [  ] Sutures [  ]  Patient wearing abdominal binder for support.  Vaginal: Lochia:  Heavy [  ]  Moderate [  ]   Scant [x  ]  Extremities: Edema [ 0 ] negative Bernardo's Sign [x  ] Nontender Karsno  [x  ] Positive pedal pulses [ x ]  Other relevant physical exam findings:none      PLAN:  Plan: Increase ambulation, analgesia PRN and pain medication protocol standing oxycodone, ibuprofen and acetaminophen.  Diet: Regular diet  Continue routine post-operative and postpartum care

## 2019-08-10 NOTE — PROGRESS NOTE ADULT - ATTENDING COMMENTS
Pt seen and examined and agree with above
PAtient seen and examined.  Agree with above assessment  Continue postoperative care

## 2019-08-11 VITALS
SYSTOLIC BLOOD PRESSURE: 98 MMHG | TEMPERATURE: 98 F | OXYGEN SATURATION: 99 % | RESPIRATION RATE: 17 BRPM | HEART RATE: 65 BPM | DIASTOLIC BLOOD PRESSURE: 57 MMHG

## 2019-08-11 RX ADMIN — HEPARIN SODIUM 5000 UNIT(S): 5000 INJECTION INTRAVENOUS; SUBCUTANEOUS at 05:34

## 2019-08-11 RX ADMIN — Medication 600 MILLIGRAM(S): at 00:17

## 2019-08-11 RX ADMIN — Medication 600 MILLIGRAM(S): at 05:35

## 2019-08-11 RX ADMIN — Medication 600 MILLIGRAM(S): at 06:24

## 2019-08-11 NOTE — PROGRESS NOTE ADULT - SUBJECTIVE AND OBJECTIVE BOX
SUBJECTIVE:    Pain: well controlled  Complaints: none    MILESTONES:    Alert and oriented x 3  [ x ]  Out of bed/ ambulating. [x  ]  Flatus: [x  ]  Postive [  ] Negative   Bowel movement  [ x ] Positive [  ] Negative   Voiding [x  ] Due to void [  ]   Diet: Regular [x  ]  Clears [  ]  NPO [  ]  Infant feeding:  Breast [ x ]   Bottle [  ]  Both [  ]  Feeding related inssues and/or concerns: none      OBJECTIVE:  T(C): 36.6 (19 @ 06:46), Max: 36.9 (08-10-19 @ 21:43)  HR: 65 (19 @ 06:46) (65 - 70)  BP: 98/57 (19 @ 06:46) (98/57 - 103/53)  RR: 17 (19 @ 06:46) (17 - 17)  SpO2: 99% (19 @ 06:46) (97% - 99%)  Wt(kg): --                        11.0   14.20 )-----------( 130      ( 09 Aug 2019 17:35 )             33.1     MEDICATIONS  (STANDING):  acetaminophen   Tablet .. 975 milliGRAM(s) Oral <User Schedule>  diphtheria/tetanus/pertussis (acellular) Vaccine (ADAcel) 0.5 milliLiter(s) IntraMuscular once  ferrous    sulfate 325 milliGRAM(s) Oral daily  heparin  Injectable 5000 Unit(s) SubCutaneous every 12 hours  ibuprofen  Tablet. 600 milliGRAM(s) Oral every 6 hours  prenatal multivitamin 1 Tablet(s) Oral daily    MEDICATIONS  (PRN):  diphenhydrAMINE 25 milliGRAM(s) Oral every 6 hours PRN Itching  docusate sodium 100 milliGRAM(s) Oral two times a day PRN Stool softening  glycerin Suppository - Adult 1 Suppository(s) Rectal at bedtime PRN Constipation  lanolin Ointment 1 Application(s) Topical every 6 hours PRN Sore Nipples  magnesium hydroxide Suspension 30 milliLiter(s) Oral two times a day PRN Constipation  oxyCODONE    IR 5 milliGRAM(s) Oral every 3 hours PRN Moderate to Severe Pain (4-10)  oxyCODONE    IR 5 milliGRAM(s) Oral once PRN Moderate to Severe Pain (4-10)  simethicone 80 milliGRAM(s) Chew every 4 hours PRN Gas    ASSESSMENT:  29y  y/o G5   P 5   PO Day# 3       Delivery: Primary [  ]    Repeat [  x]                                       Indication of procedure: previous c/s  Condition: Stable  Past Medical History significant for: HPI:  Pt of Dr. العلي: 30yo  at 38.5wks, presents to triage from Dr. العلي's office due to oligohydramnios; for admission. Plan was for cervical martel in order for patient to TOLAC but pt now desires a repeat c section. Pt denies any leakage of fluid or vaginal bleeding. Reports good fetal movement.Pt last ate at 2pm    Allergies: denies  Meds: PNV (stopped yesterday)  PMH: denies  PSH: C/S x 2  OBGYN  2010, , FT, 6#2  2012, , FT, 6#7  2013, C/S due to NRFHRT, FT, 5#2  2016, failed TOLAC, repeat C section, FT, 6#2  Current AP complications: denies (08 Aug 2019 20:35)    Current Issues: none  Heart:          RRR                    Lungs: clear  Breasts:  Soft [ x ]   Engorged [  ]  Abdomen: Soft [ x ] , distended [  ] nontender [ x ]   Bowel sounds :  Present [x  ]  Absent [  ]   Fundus firm [ x ]  Boggy [  ]  Abdominal incision: Clean, dry and intact [ x ]  Staples [  ] Steri Strips [ x ] Dermabond [  ] Sutures [  ]  Patient wearing abdominal binder for support.  Vaginal: Lochia:  Heavy [  ]  Moderate [  ]   Scant [ x ]  Extremities: Edema [ 1+ ] negative Bernardo's Sign [x  ] Nontender Karson  [x  ] Positive pedal pulses [x  ]  Other relevant physical exam findings: none      PLAN:  Plan: Increase ambulation, analgesia PRN and pain medication protocol standing oxycodone, ibuprofen and acetaminophen.  Diet: Regular diet  Continue routine post-operative and postpartum care.   Discharge Planning [x  ]

## 2019-10-07 ENCOUNTER — APPOINTMENT (OUTPATIENT)
Dept: OBGYN | Facility: CLINIC | Age: 29
End: 2019-10-07
Payer: COMMERCIAL

## 2019-10-07 ENCOUNTER — TRANSCRIPTION ENCOUNTER (OUTPATIENT)
Age: 29
End: 2019-10-07

## 2019-10-07 PROCEDURE — 0503F POSTPARTUM CARE VISIT: CPT

## 2019-11-05 NOTE — OB PST NOTE - LIVING CHILDREN, OB PROFILE
Quality 110: Preventive Care And Screening: Influenza Immunization: Influenza Immunization not Administered because Patient Refused. Detail Level: Detailed Quality 111:Pneumonia Vaccination Status For Older Adults: Pneumococcal Vaccination not Administered or Previously Received, Reason not Otherwise Specified Quality 226: Preventive Care And Screening: Tobacco Use: Screening And Cessation Intervention: Patient screened for tobacco use and is an ex/non-smoker Quality 130: Documentation Of Current Medications In The Medical Record: Current Medications Documented Quality 131: Pain Assessment And Follow-Up: Pain assessment NOT documented as being performed, documentation the patient is not eligible for a pain assessment using a standardized tool 4

## 2020-01-10 ENCOUNTER — APPOINTMENT (OUTPATIENT)
Dept: OBGYN | Facility: CLINIC | Age: 30
End: 2020-01-10

## 2022-09-12 NOTE — OB PROVIDER H&P - NS_PLACENTALOCAL_OBGYN_ALL_OB
----- Message from Migdalia Hernandez sent at 9/12/2022 10:02 AM CDT -----  Contact: Self/[phone]  Pt is calling in regards to scheduling an appointment. Please give her a call at [phone]. Thank you s/g     Anterior

## 2022-12-13 ENCOUNTER — APPOINTMENT (OUTPATIENT)
Dept: OBGYN | Facility: CLINIC | Age: 32
End: 2022-12-13

## 2022-12-13 VITALS
HEIGHT: 64 IN | BODY MASS INDEX: 25.63 KG/M2 | SYSTOLIC BLOOD PRESSURE: 108 MMHG | WEIGHT: 150.13 LBS | DIASTOLIC BLOOD PRESSURE: 67 MMHG

## 2022-12-13 PROCEDURE — 36415 COLL VENOUS BLD VENIPUNCTURE: CPT

## 2022-12-13 PROCEDURE — 0500F INITIAL PRENATAL CARE VISIT: CPT

## 2022-12-13 NOTE — HISTORY OF PRESENT ILLNESS
[Currently Active] : currently active [Men] : men [No] : No [FreeTextEntry1] : Patient is a 32 year old who presents after she had a positive home pregnancy test. LMP 10/15/22. She is endorsing occasional nausea and breast tenderness. L breast lump noted today. \par \par OBHx:\par  \par  \par 2013 C/S\par 2016 C/S\par 2019 C/S  [HIV Test offered] : HIV Test offered [Syphilis test offered] : Syphilis test offered [Gonorrhea test offered] : Gonorrhea test offered [Chlamydia test offered] : Chlamydia test offered [HPV test offered] : HPV test offered [Hepatitis B test offered] : Hepatitis B test offered [Hepatitis C test offered] : Hepatitis C test offered [N] : Patient reports normal menses [Y] : Positive pregnancy history [PGxTotal] : 6 [[facility] : 5 [[facility] : 5

## 2022-12-13 NOTE — PLAN
[FreeTextEntry1] : 32 year old P5 presenting with amenorrhea \par -f/u PAP and GC/CT done today\par -f/u prenatal blood work drawn today\par -Rx sent for PNV\par -Req given for breast sono\par -RTO 2 weeks for first trimester ultrasound and review of prenatal lab results

## 2022-12-14 DIAGNOSIS — Z00.00 ENCOUNTER FOR GENERAL ADULT MEDICAL EXAMINATION W/OUT ABNORMAL FINDINGS: ICD-10-CM

## 2022-12-14 LAB
ABO + RH PNL BLD: NORMAL
APPEARANCE: CLEAR
BACTERIA: NEGATIVE
BASOPHILS # BLD AUTO: 0.02 K/UL
BASOPHILS NFR BLD AUTO: 0.3 %
BILIRUBIN URINE: NEGATIVE
BLD GP AB SCN SERPL QL: NORMAL
BLOOD URINE: NEGATIVE
C TRACH RRNA SPEC QL NAA+PROBE: NOT DETECTED
COLOR: YELLOW
EOSINOPHIL # BLD AUTO: 0.02 K/UL
EOSINOPHIL NFR BLD AUTO: 0.3 %
ESTIMATED AVERAGE GLUCOSE: 97 MG/DL
GLUCOSE QUALITATIVE U: NEGATIVE
GLUCOSE SERPL-MCNC: 103 MG/DL
HAV IGM SER QL: NONREACTIVE
HBA1C MFR BLD HPLC: 5 %
HBV CORE IGM SER QL: NONREACTIVE
HBV SURFACE AB SER QL: REACTIVE
HBV SURFACE AG SER QL: NONREACTIVE
HBV SURFACE AG SER QL: NONREACTIVE
HCT VFR BLD CALC: 39.9 %
HCV AB SER QL: NONREACTIVE
HCV S/CO RATIO: 0.06 S/CO
HGB A MFR BLD: 97.4 %
HGB A2 MFR BLD: 2.6 %
HGB BLD-MCNC: 12.7 G/DL
HGB FRACT BLD-IMP: NORMAL
HIV1+2 AB SPEC QL IA.RAPID: NONREACTIVE
HPV HIGH+LOW RISK DNA PNL CVX: NOT DETECTED
HYALINE CASTS: 0 /LPF
IMM GRANULOCYTES NFR BLD AUTO: 0.6 %
KETONES URINE: NEGATIVE
LEAD BLD-MCNC: <1 UG/DL
LEUKOCYTE ESTERASE URINE: NEGATIVE
LYMPHOCYTES # BLD AUTO: 1.29 K/UL
LYMPHOCYTES NFR BLD AUTO: 19.2 %
MAN DIFF?: NORMAL
MCHC RBC-ENTMCNC: 30.5 PG
MCHC RBC-ENTMCNC: 31.8 GM/DL
MCV RBC AUTO: 95.9 FL
MICROSCOPIC-UA: NORMAL
MONOCYTES # BLD AUTO: 0.43 K/UL
MONOCYTES NFR BLD AUTO: 6.4 %
N GONORRHOEA RRNA SPEC QL NAA+PROBE: NOT DETECTED
NEUTROPHILS # BLD AUTO: 4.93 K/UL
NEUTROPHILS NFR BLD AUTO: 73.2 %
NITRITE URINE: NEGATIVE
PH URINE: 7.5
PLATELET # BLD AUTO: 181 K/UL
PROTEIN URINE: ABNORMAL
RBC # BLD: 4.16 M/UL
RBC # FLD: 12.7 %
RED BLOOD CELLS URINE: 6 /HPF
SOURCE AMPLIFICATION: NORMAL
SPECIFIC GRAVITY URINE: 1.03
SQUAMOUS EPITHELIAL CELLS: 0 /HPF
T PALLIDUM AB SER QL IA: NEGATIVE
UROBILINOGEN URINE: NORMAL
WBC # FLD AUTO: 6.73 K/UL
WHITE BLOOD CELLS URINE: 0 /HPF

## 2022-12-15 PROBLEM — Z00.00 ENCOUNTER FOR PREVENTIVE HEALTH EXAMINATION: Status: ACTIVE | Noted: 2019-10-04

## 2022-12-15 LAB
ALBUMIN SERPL ELPH-MCNC: 4.7 G/DL
ALP BLD-CCNC: 33 U/L
ALT SERPL-CCNC: <5 U/L
AST SERPL-CCNC: 45 U/L
BILIRUB DIRECT SERPL-MCNC: 0.1 MG/DL
BILIRUB INDIRECT SERPL-MCNC: 0.1 MG/DL
BILIRUB SERPL-MCNC: 0.2 MG/DL
HPV 16 E6+E7 MRNA CVX QL NAA+PROBE: NOT DETECTED
HPV18+45 E6+E7 MRNA CVX QL NAA+PROBE: NOT DETECTED
MEV IGG FLD QL IA: 71.3 AU/ML
MEV IGG+IGM SER-IMP: POSITIVE
MUV AB SER-ACNC: POSITIVE
MUV IGG SER QL IA: 60.2 AU/ML
PROT SERPL-MCNC: 7.1 G/DL
RUBV IGG FLD-ACNC: 1.5 INDEX
RUBV IGG SER-IMP: POSITIVE

## 2022-12-16 LAB — BACTERIA UR CULT: NORMAL

## 2022-12-19 ENCOUNTER — NON-APPOINTMENT (OUTPATIENT)
Age: 32
End: 2022-12-19

## 2022-12-19 LAB
AR GENE MUT ANL BLD/T: NORMAL
CFTR MUT TESTED BLD/T: NEGATIVE
FMR1 GENE MUT ANL BLD/T: NORMAL
M TB IFN-G BLD-IMP: NEGATIVE
QUANTIFERON TB PLUS MITOGEN MINUS NIL: 8.14 IU/ML
QUANTIFERON TB PLUS NIL: 0.02 IU/ML
QUANTIFERON TB PLUS TB1 MINUS NIL: -0.01 IU/ML
QUANTIFERON TB PLUS TB2 MINUS NIL: -0.01 IU/ML

## 2022-12-27 ENCOUNTER — APPOINTMENT (OUTPATIENT)
Dept: ANTEPARTUM | Facility: CLINIC | Age: 32
End: 2022-12-27
Payer: COMMERCIAL

## 2022-12-27 ENCOUNTER — APPOINTMENT (OUTPATIENT)
Dept: OBGYN | Facility: CLINIC | Age: 32
End: 2022-12-27

## 2022-12-27 VITALS
DIASTOLIC BLOOD PRESSURE: 71 MMHG | BODY MASS INDEX: 26.13 KG/M2 | SYSTOLIC BLOOD PRESSURE: 106 MMHG | WEIGHT: 152.25 LBS

## 2022-12-27 LAB — CYTOLOGY CVX/VAG DOC THIN PREP: NORMAL

## 2022-12-27 PROCEDURE — 76815 OB US LIMITED FETUS(S): CPT

## 2022-12-27 PROCEDURE — 0502F SUBSEQUENT PRENATAL CARE: CPT

## 2022-12-28 LAB
ALBUMIN SERPL ELPH-MCNC: 4.6 G/DL
ALP BLD-CCNC: 41 U/L
ALT SERPL-CCNC: 11 U/L
AST SERPL-CCNC: 16 U/L
BILIRUB DIRECT SERPL-MCNC: 0.1 MG/DL
BILIRUB INDIRECT SERPL-MCNC: 0.2 MG/DL
BILIRUB SERPL-MCNC: 0.3 MG/DL
PROT SERPL-MCNC: 7 G/DL

## 2023-01-10 ENCOUNTER — APPOINTMENT (OUTPATIENT)
Dept: OBGYN | Facility: CLINIC | Age: 33
End: 2023-01-10
Payer: COMMERCIAL

## 2023-01-10 ENCOUNTER — APPOINTMENT (OUTPATIENT)
Dept: ANTEPARTUM | Facility: CLINIC | Age: 33
End: 2023-01-10
Payer: COMMERCIAL

## 2023-01-10 VITALS
WEIGHT: 153 LBS | BODY MASS INDEX: 26.12 KG/M2 | SYSTOLIC BLOOD PRESSURE: 95 MMHG | DIASTOLIC BLOOD PRESSURE: 54 MMHG | HEIGHT: 64 IN

## 2023-01-10 PROCEDURE — 76801 OB US < 14 WKS SINGLE FETUS: CPT | Mod: 59

## 2023-01-10 PROCEDURE — 76813 OB US NUCHAL MEAS 1 GEST: CPT

## 2023-01-10 PROCEDURE — 0502F SUBSEQUENT PRENATAL CARE: CPT

## 2023-01-13 LAB
ADDITIONAL US: NORMAL
CRL SCAN TWIN B: NORMAL
CRL SCAN: NORMAL
CROWN RUMP LENGTH TWIN B: NORMAL
CROWN RUMP LENGTH: 63.6 MM
DIA MOM: 1.17
DIA VALUE: 259.9 PG/ML
DOWN SYNDROME AGE RISK: NORMAL
DOWN SYNDROME INTERPRETATION: NORMAL
DOWN SYNDROME SCREENING RISK: NORMAL
FIRST TRIMESTER SCREEN COMMENTS: NORMAL
FIRST TRIMESTER SCREEN NOTE: NORMAL
FIRST TRIMESTER SCREEN RESULTS: NORMAL
FIRST TRIMESTER SCREEN TEST RESULTS: NORMAL
GEST. AGE ON COLLECTION DATE: 12.6 WEEKS
HCG MOM: 1.33
HCG VALUE: 127.8 IU/ML
MATERNAL AGE AT EDD: 33.3 YR
NT MOM TWIN B: NORMAL
NT TWIN B: NORMAL
NUCHAL TRANSLUCENCY (NT): 1.7 MM
NUCHAL TRANSLUCENCY MOM: 1.27
NUMBER OF FETUSES: 1
PAPP-A MOM: 1.05
PAPP-A VALUE: 1012.2 NG/ML
RACE: NORMAL
SONOGRAPHER ID#: NORMAL
TRISOMY 18 AGE RISK: NORMAL
TRISOMY 18 INTERPRETATION: NORMAL
TRISOMY 18 SCREENING RISK: NORMAL
WEIGHT AFP: 153 LBS

## 2023-01-24 ENCOUNTER — RESULT REVIEW (OUTPATIENT)
Age: 33
End: 2023-01-24

## 2023-01-24 ENCOUNTER — APPOINTMENT (OUTPATIENT)
Dept: ULTRASOUND IMAGING | Facility: IMAGING CENTER | Age: 33
End: 2023-01-24
Payer: COMMERCIAL

## 2023-01-24 ENCOUNTER — APPOINTMENT (OUTPATIENT)
Dept: MAMMOGRAPHY | Facility: IMAGING CENTER | Age: 33
End: 2023-01-24
Payer: COMMERCIAL

## 2023-01-24 ENCOUNTER — OUTPATIENT (OUTPATIENT)
Dept: OUTPATIENT SERVICES | Facility: HOSPITAL | Age: 33
LOS: 1 days | End: 2023-01-24
Payer: COMMERCIAL

## 2023-01-24 DIAGNOSIS — Z00.00 ENCOUNTER FOR GENERAL ADULT MEDICAL EXAMINATION WITHOUT ABNORMAL FINDINGS: ICD-10-CM

## 2023-01-24 DIAGNOSIS — Z98.891 HISTORY OF UTERINE SCAR FROM PREVIOUS SURGERY: Chronic | ICD-10-CM

## 2023-01-24 PROCEDURE — 76641 ULTRASOUND BREAST COMPLETE: CPT | Mod: 26,50

## 2023-01-24 PROCEDURE — G0279: CPT

## 2023-01-24 PROCEDURE — 77066 DX MAMMO INCL CAD BI: CPT

## 2023-01-24 PROCEDURE — 76641 ULTRASOUND BREAST COMPLETE: CPT

## 2023-02-07 ENCOUNTER — APPOINTMENT (OUTPATIENT)
Dept: OBGYN | Facility: CLINIC | Age: 33
End: 2023-02-07
Payer: COMMERCIAL

## 2023-02-07 ENCOUNTER — NON-APPOINTMENT (OUTPATIENT)
Age: 33
End: 2023-02-07

## 2023-02-07 VITALS — WEIGHT: 158 LBS | DIASTOLIC BLOOD PRESSURE: 64 MMHG | SYSTOLIC BLOOD PRESSURE: 96 MMHG | BODY MASS INDEX: 27.12 KG/M2

## 2023-02-07 PROCEDURE — 0502F SUBSEQUENT PRENATAL CARE: CPT

## 2023-02-13 LAB
AFP MOM: 1.36
AFP VALUE: 48.6 NG/ML
ALPHA FETOPROTEIN SERUM COMMENT: NORMAL
ALPHA FETOPROTEIN SERUM INTERPRETATION: NORMAL
ALPHA FETOPROTEIN SERUM RESULTS: NORMAL
ALPHA FETOPROTEIN SERUM TEST RESULTS: NORMAL
GESTATIONAL AGE BASED ON: NORMAL
GESTATIONAL AGE ON COLLECTION DATE: 16.4 WEEKS
INSULIN DEP DIABETES: NO
MATERNAL AGE AT EDD AFP: 33.3 YR
MULTIPLE GESTATION: NO
OSBR RISK 1 IN: 4034
RACE: NORMAL
WEIGHT AFP: 158 LBS

## 2023-03-07 ENCOUNTER — APPOINTMENT (OUTPATIENT)
Dept: ANTEPARTUM | Facility: CLINIC | Age: 33
End: 2023-03-07
Payer: COMMERCIAL

## 2023-03-07 ENCOUNTER — APPOINTMENT (OUTPATIENT)
Dept: OBGYN | Facility: CLINIC | Age: 33
End: 2023-03-07
Payer: COMMERCIAL

## 2023-03-07 VITALS
DIASTOLIC BLOOD PRESSURE: 65 MMHG | HEIGHT: 64 IN | WEIGHT: 164 LBS | BODY MASS INDEX: 28 KG/M2 | SYSTOLIC BLOOD PRESSURE: 98 MMHG

## 2023-03-07 PROCEDURE — 76805 OB US >/= 14 WKS SNGL FETUS: CPT

## 2023-03-07 PROCEDURE — 0502F SUBSEQUENT PRENATAL CARE: CPT

## 2023-04-03 ENCOUNTER — APPOINTMENT (OUTPATIENT)
Dept: OBGYN | Facility: CLINIC | Age: 33
End: 2023-04-03
Payer: COMMERCIAL

## 2023-04-03 VITALS
SYSTOLIC BLOOD PRESSURE: 104 MMHG | HEIGHT: 64 IN | DIASTOLIC BLOOD PRESSURE: 71 MMHG | BODY MASS INDEX: 27.83 KG/M2 | WEIGHT: 163 LBS

## 2023-04-03 DIAGNOSIS — Z33.1 PREGNANT STATE, INCIDENTAL: ICD-10-CM

## 2023-04-03 PROCEDURE — 0502F SUBSEQUENT PRENATAL CARE: CPT

## 2023-04-04 LAB
BASOPHILS # BLD AUTO: 0.02 K/UL
BASOPHILS NFR BLD AUTO: 0.2 %
EOSINOPHIL # BLD AUTO: 0.06 K/UL
EOSINOPHIL NFR BLD AUTO: 0.7 %
GLUCOSE 1H P 50 G GLC PO SERPL-MCNC: 103 MG/DL
HCT VFR BLD CALC: 37.4 %
HGB BLD-MCNC: 11.9 G/DL
IMM GRANULOCYTES NFR BLD AUTO: 0.5 %
LYMPHOCYTES # BLD AUTO: 1.36 K/UL
LYMPHOCYTES NFR BLD AUTO: 15.9 %
MAN DIFF?: NORMAL
MCHC RBC-ENTMCNC: 31.2 PG
MCHC RBC-ENTMCNC: 31.8 GM/DL
MCV RBC AUTO: 97.9 FL
MONOCYTES # BLD AUTO: 0.44 K/UL
MONOCYTES NFR BLD AUTO: 5.1 %
NEUTROPHILS # BLD AUTO: 6.64 K/UL
NEUTROPHILS NFR BLD AUTO: 77.6 %
PLATELET # BLD AUTO: 189 K/UL
RBC # BLD: 3.82 M/UL
RBC # FLD: 14 %
WBC # FLD AUTO: 8.56 K/UL

## 2023-04-24 ENCOUNTER — NON-APPOINTMENT (OUTPATIENT)
Age: 33
End: 2023-04-24

## 2023-05-02 ENCOUNTER — APPOINTMENT (OUTPATIENT)
Dept: OBGYN | Facility: CLINIC | Age: 33
End: 2023-05-02
Payer: COMMERCIAL

## 2023-05-02 VITALS — SYSTOLIC BLOOD PRESSURE: 105 MMHG | DIASTOLIC BLOOD PRESSURE: 64 MMHG | WEIGHT: 170 LBS | BODY MASS INDEX: 29.18 KG/M2

## 2023-05-02 PROCEDURE — 90471 IMMUNIZATION ADMIN: CPT

## 2023-05-02 PROCEDURE — 90715 TDAP VACCINE 7 YRS/> IM: CPT

## 2023-05-02 PROCEDURE — 0502F SUBSEQUENT PRENATAL CARE: CPT

## 2023-05-16 ENCOUNTER — APPOINTMENT (OUTPATIENT)
Dept: OBGYN | Facility: CLINIC | Age: 33
End: 2023-05-16
Payer: COMMERCIAL

## 2023-05-16 ENCOUNTER — APPOINTMENT (OUTPATIENT)
Dept: ANTEPARTUM | Facility: CLINIC | Age: 33
End: 2023-05-16
Payer: COMMERCIAL

## 2023-05-16 VITALS
SYSTOLIC BLOOD PRESSURE: 100 MMHG | WEIGHT: 170 LBS | HEIGHT: 64 IN | BODY MASS INDEX: 29.02 KG/M2 | DIASTOLIC BLOOD PRESSURE: 63 MMHG

## 2023-05-16 PROCEDURE — 76816 OB US FOLLOW-UP PER FETUS: CPT

## 2023-05-16 PROCEDURE — 76819 FETAL BIOPHYS PROFIL W/O NST: CPT | Mod: 59

## 2023-05-16 PROCEDURE — 0502F SUBSEQUENT PRENATAL CARE: CPT

## 2023-05-16 RX ORDER — MULTI-VITAMIN/MINERAL SUPPLEMENT WITH SODIUM ASCORBATE, CHOLECALCIFEROL, DI-ALPHA-TOCOPHERYL ACETATE, THIAMINE MONONITRATE, RIBOFLAVIN, NIACINAMIDE, PYRIDOXINE HCL, FOLIC ACID, CYANOCOBALAMIN, CALCIUM FORMATE, CALCIUM CARBONATE, FERROUS (II) BIS-GLYCINATE CHELATE, POTASSIUM IODIDE, ZINC OXIDE, AND CHOLINE BITARTRATE 50; 155; 45; 32; 55; 30; 3; 1; 20; 10; 100; 10; 120; 3; 450 MG/1; MG/1; MG/1; MG/1; MG/1; [IU]/1; MG/1; MG/1; MG/1; UG/1; UG/1; MG/1; MG/1; MG/1; [IU]/1
32-1 TABLET, FILM COATED ORAL
Qty: 30 | Refills: 3 | Status: ACTIVE | COMMUNITY
Start: 2023-05-16 | End: 1900-01-01

## 2023-05-30 ENCOUNTER — APPOINTMENT (OUTPATIENT)
Dept: OBGYN | Facility: CLINIC | Age: 33
End: 2023-05-30
Payer: COMMERCIAL

## 2023-05-30 VITALS — DIASTOLIC BLOOD PRESSURE: 64 MMHG | SYSTOLIC BLOOD PRESSURE: 105 MMHG | BODY MASS INDEX: 29.87 KG/M2 | WEIGHT: 174 LBS

## 2023-05-30 PROCEDURE — 0502F SUBSEQUENT PRENATAL CARE: CPT

## 2023-06-13 ENCOUNTER — APPOINTMENT (OUTPATIENT)
Dept: OBGYN | Facility: CLINIC | Age: 33
End: 2023-06-13
Payer: COMMERCIAL

## 2023-06-13 ENCOUNTER — APPOINTMENT (OUTPATIENT)
Dept: ANTEPARTUM | Facility: CLINIC | Age: 33
End: 2023-06-13

## 2023-06-13 ENCOUNTER — NON-APPOINTMENT (OUTPATIENT)
Age: 33
End: 2023-06-13

## 2023-06-13 VITALS
WEIGHT: 177 LBS | DIASTOLIC BLOOD PRESSURE: 68 MMHG | BODY MASS INDEX: 30.22 KG/M2 | HEIGHT: 64 IN | SYSTOLIC BLOOD PRESSURE: 104 MMHG

## 2023-06-13 PROCEDURE — 0502F SUBSEQUENT PRENATAL CARE: CPT

## 2023-06-20 ENCOUNTER — APPOINTMENT (OUTPATIENT)
Dept: ANTEPARTUM | Facility: CLINIC | Age: 33
End: 2023-06-20
Payer: COMMERCIAL

## 2023-06-20 ENCOUNTER — APPOINTMENT (OUTPATIENT)
Dept: OBGYN | Facility: CLINIC | Age: 33
End: 2023-06-20
Payer: COMMERCIAL

## 2023-06-20 VITALS
DIASTOLIC BLOOD PRESSURE: 71 MMHG | HEIGHT: 64 IN | WEIGHT: 175 LBS | BODY MASS INDEX: 29.88 KG/M2 | SYSTOLIC BLOOD PRESSURE: 105 MMHG

## 2023-06-20 PROCEDURE — 0502F SUBSEQUENT PRENATAL CARE: CPT

## 2023-06-20 PROCEDURE — 76818 FETAL BIOPHYS PROFILE W/NST: CPT

## 2023-06-27 ENCOUNTER — APPOINTMENT (OUTPATIENT)
Dept: OBGYN | Facility: CLINIC | Age: 33
End: 2023-06-27
Payer: COMMERCIAL

## 2023-06-27 ENCOUNTER — NON-APPOINTMENT (OUTPATIENT)
Age: 33
End: 2023-06-27

## 2023-06-27 VITALS
BODY MASS INDEX: 30.39 KG/M2 | SYSTOLIC BLOOD PRESSURE: 104 MMHG | HEIGHT: 64 IN | DIASTOLIC BLOOD PRESSURE: 63 MMHG | WEIGHT: 178 LBS

## 2023-06-27 PROCEDURE — 0502F SUBSEQUENT PRENATAL CARE: CPT

## 2023-06-29 LAB
B-HEM STREP SPEC QL CULT: NORMAL
HIV1+2 AB SPEC QL IA.RAPID: NONREACTIVE

## 2023-07-03 RX ORDER — FERROUS SULFATE TAB EC 325 MG (65 MG FE EQUIVALENT) 325 (65 FE) MG
325 (65 FE) TABLET DELAYED RESPONSE ORAL DAILY
Qty: 30 | Refills: 6 | Status: ACTIVE | COMMUNITY
Start: 2023-07-03 | End: 1900-01-01

## 2023-07-05 ENCOUNTER — APPOINTMENT (OUTPATIENT)
Dept: OBGYN | Facility: CLINIC | Age: 33
End: 2023-07-05
Payer: COMMERCIAL

## 2023-07-05 VITALS — DIASTOLIC BLOOD PRESSURE: 61 MMHG | SYSTOLIC BLOOD PRESSURE: 102 MMHG | WEIGHT: 178 LBS | BODY MASS INDEX: 30.55 KG/M2

## 2023-07-05 DIAGNOSIS — Z34.90 ENCOUNTER FOR SUPERVISION OF NORMAL PREGNANCY, UNSPECIFIED, UNSPECIFIED TRIMESTER: ICD-10-CM

## 2023-07-05 PROCEDURE — 0502F SUBSEQUENT PRENATAL CARE: CPT

## 2023-07-05 RX ORDER — ASCORBIC ACID, CHOLECALCIFEROL, .ALPHA.-TOCOPHEROL ACETATE, DL-, PYRIDOXINE, FOLIC ACID, CYANOCOBALAMIN, CALCIUM, FERROUS FUMARATE, MAGNESIUM, DOCONEXENT 85; 200; 10; 25; 1; 12; 140; 27; 45; 300 [IU]/1; [IU]/1; [IU]/1; [IU]/1; MG/1; UG/1; MG/1; MG/1; MG/1; MG/1
27-0.6-0.4-3 CAPSULE, GELATIN COATED ORAL
Qty: 90 | Refills: 3 | Status: ACTIVE | COMMUNITY
Start: 2022-12-13 | End: 1900-01-01

## 2023-07-06 ENCOUNTER — OUTPATIENT (OUTPATIENT)
Dept: OUTPATIENT SERVICES | Facility: HOSPITAL | Age: 33
LOS: 1 days | End: 2023-07-06

## 2023-07-06 VITALS
HEART RATE: 89 BPM | OXYGEN SATURATION: 96 % | TEMPERATURE: 98 F | DIASTOLIC BLOOD PRESSURE: 64 MMHG | RESPIRATION RATE: 18 BRPM | HEIGHT: 64 IN | WEIGHT: 177.91 LBS | SYSTOLIC BLOOD PRESSURE: 97 MMHG

## 2023-07-06 DIAGNOSIS — Z98.891 HISTORY OF UTERINE SCAR FROM PREVIOUS SURGERY: Chronic | ICD-10-CM

## 2023-07-06 DIAGNOSIS — Z34.90 ENCOUNTER FOR SUPERVISION OF NORMAL PREGNANCY, UNSPECIFIED, UNSPECIFIED TRIMESTER: ICD-10-CM

## 2023-07-06 LAB
APPEARANCE UR: ABNORMAL
BACTERIA # UR AUTO: NEGATIVE /HPF — SIGNIFICANT CHANGE UP
BILIRUB UR-MCNC: NEGATIVE — SIGNIFICANT CHANGE UP
BLD GP AB SCN SERPL QL: NEGATIVE — SIGNIFICANT CHANGE UP
CAST: 0 /LPF — SIGNIFICANT CHANGE UP (ref 0–4)
COLOR SPEC: YELLOW — SIGNIFICANT CHANGE UP
DIFF PNL FLD: NEGATIVE — SIGNIFICANT CHANGE UP
GLUCOSE UR QL: NEGATIVE MG/DL — SIGNIFICANT CHANGE UP
HCT VFR BLD CALC: 33.9 % — LOW (ref 34.5–45)
HGB BLD-MCNC: 11.3 G/DL — LOW (ref 11.5–15.5)
KETONES UR-MCNC: NEGATIVE MG/DL — SIGNIFICANT CHANGE UP
LEUKOCYTE ESTERASE UR-ACNC: NEGATIVE — SIGNIFICANT CHANGE UP
MCHC RBC-ENTMCNC: 29.8 PG — SIGNIFICANT CHANGE UP (ref 27–34)
MCHC RBC-ENTMCNC: 33.3 GM/DL — SIGNIFICANT CHANGE UP (ref 32–36)
MCV RBC AUTO: 89.4 FL — SIGNIFICANT CHANGE UP (ref 80–100)
NITRITE UR-MCNC: NEGATIVE — SIGNIFICANT CHANGE UP
NRBC # BLD: 0 /100 WBCS — SIGNIFICANT CHANGE UP (ref 0–0)
NRBC # FLD: 0 K/UL — SIGNIFICANT CHANGE UP (ref 0–0)
PH UR: 6.5 — SIGNIFICANT CHANGE UP (ref 5–8)
PLATELET # BLD AUTO: 179 K/UL — SIGNIFICANT CHANGE UP (ref 150–400)
PROT UR-MCNC: SIGNIFICANT CHANGE UP MG/DL
RBC # BLD: 3.79 M/UL — LOW (ref 3.8–5.2)
RBC # FLD: 13.3 % — SIGNIFICANT CHANGE UP (ref 10.3–14.5)
RBC CASTS # UR COMP ASSIST: 1 /HPF — SIGNIFICANT CHANGE UP (ref 0–4)
REVIEW: SIGNIFICANT CHANGE UP
RH IG SCN BLD-IMP: POSITIVE — SIGNIFICANT CHANGE UP
SP GR SPEC: 1.03 — SIGNIFICANT CHANGE UP (ref 1–1.03)
SQUAMOUS # UR AUTO: 3 /HPF — SIGNIFICANT CHANGE UP (ref 0–5)
UROBILINOGEN FLD QL: 0.2 MG/DL — SIGNIFICANT CHANGE UP (ref 0.2–1)
WBC # BLD: 8.49 K/UL — SIGNIFICANT CHANGE UP (ref 3.8–10.5)
WBC # FLD AUTO: 8.49 K/UL — SIGNIFICANT CHANGE UP (ref 3.8–10.5)
WBC UR QL: 3 /HPF — SIGNIFICANT CHANGE UP (ref 0–5)

## 2023-07-06 NOTE — OB PST NOTE - NSICDXPASTSURGICALHX_GEN_ALL_CORE_FT
PAST SURGICAL HISTORY:  History of  section 2013 M [facility] 5#6  2016 F arrest of dilatation 6#2

## 2023-07-06 NOTE — OB PST NOTE - HISTORY OF PRESENT ILLNESS
32 yo female presents to PST unit scheduled for repeat  section, bilateral tubal ligation with Dr. Vincent. RUCHI 2023. She reports positive fetal movement, denies loss of fluid, vaginal bleeding or painful uterine contractions

## 2023-07-06 NOTE — OB PST NOTE - NSICDXPASTMEDICALHX_GEN_ALL_CORE_FT
Hospital chart PAST MEDICAL HISTORY:  Failure to progress in labor     History of  section     Normal vaginal delivery 2010 F 6#2   F 6#7

## 2023-07-06 NOTE — OB PST NOTE - PROBLEM SELECTOR PLAN 1
Scheduled for repeat  section, bilateral tubal ligation with Dr. Vincent on 2023.  Verbal and written pre-op instructions provided to patient. Patient verbalized understanding and will call surgeons office for revised instructions if surgery is rescheduled.  Preoperative instructions reviewed. No food 8 hours prior to , clear liquids up to 2.5 hours prior to procedure. Advised to drink regular Gatorade prior to admission. Pending labs Type & screen, CBC, UA. Last dose of PNV 7 days prior to surgery.   Patient given verbal and written instruction with teach back on chlorhexidine shampoo, and the patient verbalized understanding with return demonstration.

## 2023-07-13 ENCOUNTER — APPOINTMENT (OUTPATIENT)
Dept: OBGYN | Facility: CLINIC | Age: 33
End: 2023-07-13
Payer: COMMERCIAL

## 2023-07-13 ENCOUNTER — APPOINTMENT (OUTPATIENT)
Dept: ANTEPARTUM | Facility: CLINIC | Age: 33
End: 2023-07-13
Payer: COMMERCIAL

## 2023-07-13 VITALS
DIASTOLIC BLOOD PRESSURE: 72 MMHG | WEIGHT: 179 LBS | HEIGHT: 64 IN | BODY MASS INDEX: 30.56 KG/M2 | SYSTOLIC BLOOD PRESSURE: 112 MMHG

## 2023-07-13 PROCEDURE — 76819 FETAL BIOPHYS PROFIL W/O NST: CPT | Mod: 59

## 2023-07-13 PROCEDURE — 0502F SUBSEQUENT PRENATAL CARE: CPT

## 2023-07-13 PROCEDURE — 76816 OB US FOLLOW-UP PER FETUS: CPT

## 2023-07-14 ENCOUNTER — APPOINTMENT (OUTPATIENT)
Dept: OBGYN | Facility: CLINIC | Age: 33
End: 2023-07-14

## 2023-07-16 ENCOUNTER — TRANSCRIPTION ENCOUNTER (OUTPATIENT)
Age: 33
End: 2023-07-16

## 2023-07-17 ENCOUNTER — TRANSCRIPTION ENCOUNTER (OUTPATIENT)
Age: 33
End: 2023-07-17

## 2023-07-17 ENCOUNTER — RESULT REVIEW (OUTPATIENT)
Age: 33
End: 2023-07-17

## 2023-07-17 ENCOUNTER — INPATIENT (INPATIENT)
Facility: HOSPITAL | Age: 33
LOS: 1 days | Discharge: HOME | End: 2023-07-19
Attending: STUDENT IN AN ORGANIZED HEALTH CARE EDUCATION/TRAINING PROGRAM | Admitting: STUDENT IN AN ORGANIZED HEALTH CARE EDUCATION/TRAINING PROGRAM
Payer: COMMERCIAL

## 2023-07-17 VITALS — HEART RATE: 80 BPM | DIASTOLIC BLOOD PRESSURE: 62 MMHG | SYSTOLIC BLOOD PRESSURE: 98 MMHG | RESPIRATION RATE: 14 BRPM

## 2023-07-17 DIAGNOSIS — O34.211 MATERNAL CARE FOR LOW TRANSVERSE SCAR FROM PREVIOUS CESAREAN DELIVERY: ICD-10-CM

## 2023-07-17 DIAGNOSIS — Z98.891 HISTORY OF UTERINE SCAR FROM PREVIOUS SURGERY: Chronic | ICD-10-CM

## 2023-07-17 LAB
BASOPHILS # BLD AUTO: 0.02 K/UL — SIGNIFICANT CHANGE UP (ref 0–0.2)
BASOPHILS NFR BLD AUTO: 0.2 % — SIGNIFICANT CHANGE UP (ref 0–2)
COVID-19 SPIKE DOMAIN AB INTERP: POSITIVE
COVID-19 SPIKE DOMAIN ANTIBODY RESULT: >250 U/ML — HIGH
EOSINOPHIL # BLD AUTO: 0.04 K/UL — SIGNIFICANT CHANGE UP (ref 0–0.5)
EOSINOPHIL NFR BLD AUTO: 0.5 % — SIGNIFICANT CHANGE UP (ref 0–6)
HCT VFR BLD CALC: 36.7 % — SIGNIFICANT CHANGE UP (ref 34.5–45)
HGB BLD-MCNC: 12.5 G/DL — SIGNIFICANT CHANGE UP (ref 11.5–15.5)
IANC: 6.52 K/UL — SIGNIFICANT CHANGE UP (ref 1.8–7.4)
IMM GRANULOCYTES NFR BLD AUTO: 0.5 % — SIGNIFICANT CHANGE UP (ref 0–0.9)
LYMPHOCYTES # BLD AUTO: 1.31 K/UL — SIGNIFICANT CHANGE UP (ref 1–3.3)
LYMPHOCYTES # BLD AUTO: 15.7 % — SIGNIFICANT CHANGE UP (ref 13–44)
MCHC RBC-ENTMCNC: 31.3 PG — SIGNIFICANT CHANGE UP (ref 27–34)
MCHC RBC-ENTMCNC: 34.1 GM/DL — SIGNIFICANT CHANGE UP (ref 32–36)
MCV RBC AUTO: 91.8 FL — SIGNIFICANT CHANGE UP (ref 80–100)
MONOCYTES # BLD AUTO: 0.41 K/UL — SIGNIFICANT CHANGE UP (ref 0–0.9)
MONOCYTES NFR BLD AUTO: 4.9 % — SIGNIFICANT CHANGE UP (ref 2–14)
NEUTROPHILS # BLD AUTO: 6.52 K/UL — SIGNIFICANT CHANGE UP (ref 1.8–7.4)
NEUTROPHILS NFR BLD AUTO: 78.2 % — HIGH (ref 43–77)
NRBC # BLD: 0 /100 WBCS — SIGNIFICANT CHANGE UP (ref 0–0)
NRBC # FLD: 0 K/UL — SIGNIFICANT CHANGE UP (ref 0–0)
PLATELET # BLD AUTO: 151 K/UL — SIGNIFICANT CHANGE UP (ref 150–400)
RBC # BLD: 4 M/UL — SIGNIFICANT CHANGE UP (ref 3.8–5.2)
RBC # FLD: 13.6 % — SIGNIFICANT CHANGE UP (ref 10.3–14.5)
RH IG SCN BLD-IMP: POSITIVE — SIGNIFICANT CHANGE UP
SARS-COV-2 IGG+IGM SERPL QL IA: >250 U/ML — HIGH
SARS-COV-2 IGG+IGM SERPL QL IA: POSITIVE
T PALLIDUM AB TITR SER: NEGATIVE — SIGNIFICANT CHANGE UP
WBC # BLD: 8.34 K/UL — SIGNIFICANT CHANGE UP (ref 3.8–10.5)
WBC # FLD AUTO: 8.34 K/UL — SIGNIFICANT CHANGE UP (ref 3.8–10.5)

## 2023-07-17 PROCEDURE — 88302 TISSUE EXAM BY PATHOLOGIST: CPT | Mod: 26

## 2023-07-17 PROCEDURE — 59510 CESAREAN DELIVERY: CPT | Mod: U9,GC

## 2023-07-17 DEVICE — SURGICEL SNOW 2 X 4": Type: IMPLANTABLE DEVICE | Status: FUNCTIONAL

## 2023-07-17 RX ORDER — ACETAMINOPHEN 500 MG
975 TABLET ORAL
Refills: 0 | Status: DISCONTINUED | OUTPATIENT
Start: 2023-07-17 | End: 2023-07-19

## 2023-07-17 RX ORDER — OXYTOCIN 10 UNIT/ML
333.33 VIAL (ML) INJECTION
Qty: 20 | Refills: 0 | Status: DISCONTINUED | OUTPATIENT
Start: 2023-07-17 | End: 2023-07-18

## 2023-07-17 RX ORDER — SODIUM CHLORIDE 9 MG/ML
1000 INJECTION, SOLUTION INTRAVENOUS
Refills: 0 | Status: DISCONTINUED | OUTPATIENT
Start: 2023-07-17 | End: 2023-07-18

## 2023-07-17 RX ORDER — FAMOTIDINE 10 MG/ML
20 INJECTION INTRAVENOUS ONCE
Refills: 0 | Status: COMPLETED | OUTPATIENT
Start: 2023-07-17 | End: 2023-07-17

## 2023-07-17 RX ORDER — SODIUM CHLORIDE 9 MG/ML
1000 INJECTION, SOLUTION INTRAVENOUS
Refills: 0 | Status: DISCONTINUED | OUTPATIENT
Start: 2023-07-17 | End: 2023-07-17

## 2023-07-17 RX ORDER — IBUPROFEN 200 MG
600 TABLET ORAL EVERY 6 HOURS
Refills: 0 | Status: COMPLETED | OUTPATIENT
Start: 2023-07-17 | End: 2024-06-14

## 2023-07-17 RX ORDER — LANOLIN
1 OINTMENT (GRAM) TOPICAL EVERY 6 HOURS
Refills: 0 | Status: DISCONTINUED | OUTPATIENT
Start: 2023-07-17 | End: 2023-07-19

## 2023-07-17 RX ORDER — OXYCODONE HYDROCHLORIDE 5 MG/1
5 TABLET ORAL ONCE
Refills: 0 | Status: DISCONTINUED | OUTPATIENT
Start: 2023-07-17 | End: 2023-07-19

## 2023-07-17 RX ORDER — OXYCODONE HYDROCHLORIDE 5 MG/1
5 TABLET ORAL
Refills: 0 | Status: DISCONTINUED | OUTPATIENT
Start: 2023-07-17 | End: 2023-07-19

## 2023-07-17 RX ORDER — KETOROLAC TROMETHAMINE 30 MG/ML
30 SYRINGE (ML) INJECTION EVERY 6 HOURS
Refills: 0 | Status: DISCONTINUED | OUTPATIENT
Start: 2023-07-17 | End: 2023-07-18

## 2023-07-17 RX ORDER — TETANUS TOXOID, REDUCED DIPHTHERIA TOXOID AND ACELLULAR PERTUSSIS VACCINE, ADSORBED 5; 2.5; 8; 8; 2.5 [IU]/.5ML; [IU]/.5ML; UG/.5ML; UG/.5ML; UG/.5ML
0.5 SUSPENSION INTRAMUSCULAR ONCE
Refills: 0 | Status: DISCONTINUED | OUTPATIENT
Start: 2023-07-17 | End: 2023-07-19

## 2023-07-17 RX ORDER — CITRIC ACID/SODIUM CITRATE 300-500 MG
30 SOLUTION, ORAL ORAL ONCE
Refills: 0 | Status: COMPLETED | OUTPATIENT
Start: 2023-07-17 | End: 2023-07-17

## 2023-07-17 RX ORDER — SIMETHICONE 80 MG/1
80 TABLET, CHEWABLE ORAL EVERY 4 HOURS
Refills: 0 | Status: DISCONTINUED | OUTPATIENT
Start: 2023-07-17 | End: 2023-07-19

## 2023-07-17 RX ORDER — MAGNESIUM HYDROXIDE 400 MG/1
30 TABLET, CHEWABLE ORAL
Refills: 0 | Status: DISCONTINUED | OUTPATIENT
Start: 2023-07-17 | End: 2023-07-19

## 2023-07-17 RX ORDER — HEPARIN SODIUM 5000 [USP'U]/ML
5000 INJECTION INTRAVENOUS; SUBCUTANEOUS EVERY 8 HOURS
Refills: 0 | Status: DISCONTINUED | OUTPATIENT
Start: 2023-07-17 | End: 2023-07-18

## 2023-07-17 RX ORDER — DIPHENHYDRAMINE HCL 50 MG
25 CAPSULE ORAL EVERY 6 HOURS
Refills: 0 | Status: DISCONTINUED | OUTPATIENT
Start: 2023-07-17 | End: 2023-07-19

## 2023-07-17 RX ADMIN — Medication 30 MILLIGRAM(S): at 17:42

## 2023-07-17 RX ADMIN — Medication 30 MILLIGRAM(S): at 18:08

## 2023-07-17 RX ADMIN — Medication 30 MILLILITER(S): at 09:18

## 2023-07-17 RX ADMIN — HEPARIN SODIUM 5000 UNIT(S): 5000 INJECTION INTRAVENOUS; SUBCUTANEOUS at 17:42

## 2023-07-17 RX ADMIN — SIMETHICONE 80 MILLIGRAM(S): 80 TABLET, CHEWABLE ORAL at 19:05

## 2023-07-17 RX ADMIN — SODIUM CHLORIDE 200 MILLILITER(S): 9 INJECTION, SOLUTION INTRAVENOUS at 09:19

## 2023-07-17 RX ADMIN — Medication 975 MILLIGRAM(S): at 19:05

## 2023-07-17 RX ADMIN — FAMOTIDINE 20 MILLIGRAM(S): 10 INJECTION INTRAVENOUS at 09:18

## 2023-07-17 NOTE — OB RN PATIENT PROFILE - NSICDXPASTSURGICALHX_GEN_ALL_CORE_FT
PAST SURGICAL HISTORY:  History of  section 2013 M [facility] 5#6  2016 F arrest of dilatation 6#2

## 2023-07-17 NOTE — DISCHARGE NOTE OB - PATIENT PORTAL LINK FT
You can access the FollowMyHealth Patient Portal offered by [facility] by registering at the following website: [email or web address]. By joining [employer]’s FollowMyHealth portal, you will also be able to view your health information using other applications (apps) compatible with our system.

## 2023-07-17 NOTE — OB RN DELIVERY SUMMARY - NSSELHIDDEN_OBGYN_ALL_OB_FT
[NS_[email or web address]_ALL_OB_FT:[id number]],[NS_DeliveryAssist1_OBGYN_ALL_OB_FT:[id number]],[NS_DeliveryRN_OBGYN_ALL_OB_FT:[id number]]

## 2023-07-17 NOTE — DISCHARGE NOTE OB - CARE PLAN
1 Principal Discharge DX:	 delivery delivered  Assessment and plan of treatment:	Nothing per vagina  No tub soaking or heavy lifting

## 2023-07-17 NOTE — OB RN INTRAOPERATIVE NOTE - NS_POSTSURGSKIN_OBGYN_ALL_OB
[facility]   Intensive Care Unit Admission History & Physical Note                                              Name: Julius Albright MRN# [id number]   Parents: Madhavi Albright and Prabhakar Connor  Date/Time of Birth:  2018 7:23 PM    Date of Admission:   2018  7:23 PM     History of Present Illness   Term 7 lb 8.3 oz (3410 g), Gestational Age: 41w1d appropriate for gestational age, male infant born by vaginal delivery due to labor . Our team was asked by Carlos Graf CNM  to care for this infant born at [facility].    The infant was then brought to the NICU for further evaluation, monitoring and treatment of mild  depression, respiratory distress,  and possible sepsis.     Patient Active Problem List   Diagnosis     Respiratory distress of       depression     Metabolic acidosis     Need for observation and evaluation of  for sepsis       Obstetrics History   He was born to a 36 year-old,   woman with an EDC of 2018 based on LMP of 2017. Prenatal laboratory serologies include: blood type O, Rh Pos, antibody screen negative, rubella immune, trep ab negative, HepBsAg negative, HIV negative, GBS PCR positive.     Previous obstetrical history is unremarkable. This pregnancy was complicated by GBS positive status and fetal echocardiogram significant for small midseptal muscular VSD.    Medications during this pregnancy included PNV, Vitamin D and Fish Oil.    Birth History:   His mother was admitted to the hospital on 2018 for induction of labor. Labor and delivery were complicated by fetal heart rate decelerations and mild  depression. ROM occurred 6 hours prior to delivery. Amniotic fluid was clear with terminal meconium noted.  Medications during labor included epidural anesthesia, narcotics and antibiotics for GBS positive status.      The NICU  team was called to the DR after delivery of the infant. The infant was delivered by vaginal delivery. Resuscitation included: PPV, CPAP and increased FiO2 up to 40%. Infant with poor perfusion and respiratory failure so was transferred to NICU on CPAP.    Apgar scores were 6 at one minute, 5 at five minutes of age, and 9 at ten minutes of age.        Interval History   N/A        Assessment & Plan   Overall Status:    4 hours old term AGA male, now 41w1d PMA.     This patient is critically ill with respiratory failure requiring NCPAP support.      Access:    PIV. Consider UAC/UVC as indicated.    FEN:  Vitals:    18   Weight: 3.41 kg (7 lb 8.3 oz)       Malnutrition. Hyperglycemic - serum glu on admission 119.    - TF goal 80 ml/kg/day.  - Enteral nutrition per feeding protocol and supplement with sTPN and IL.  - Consult lactation specialist and dietician.  - Monitor fluid status, glucose and electrolytes. Serum electroytes in am.     Resp:   Respiratory failure requiring nasal CPAP +6    - Blood gas significant for compensated metabolic acidosis upon admission  - Monitor respiratory status closely.   - Wean as tolerates.   - Administer additional surfactant if unable to wean.    CV:   Hypotension/shock and lactic acidosis requiring fluid boluses upon admission.  Prenatal echocardiogram significant for small midseptal muscular VSD on prenatal echocardiogram.  - Plan to obtain  echocardiogram  - Goal mBP of 40.  - Monitor BP and perfusion closely.     ID:   Potential for sepsis due to GBS positive and respiratory distress at birth. IAP administered x 6 doses PTD.   - CBC d/p and blood cultures on admission, consider CRP at >24 hours.   - Ampicillin and gentamicin.    Hematology:   Risk for anemia of prematurity.    Recent Labs  Lab 18   HGB 18.0     - Monitor hemoglobin and transfuse to maintain Hgb > 13.    Jaundice:   At risk for hyperbilirubinemia due to ABO/Rh incompatibility  (maternal blood type O+).  - Determine blood type and DENICE if bilirubin rapidly rising or phototherapy indicated.    - Monitor bilirubin and hemoglobin. Consider phototherapy for based on AAP Nomogram.    CNS  Exam normal tone in all extremities, reflexes WNL    - Monitor clinical status.    Thermoregulation:  - Monitor temperature and provide thermal support as indicated.    HCM:  - [place] at 24 hours of age or before any transfusion.  - Obtain hearing/CCHD/carseat screens PTD.  - Continue standard NICU cares and family education plan.    Immunizations   - Give Hep B immunization now (BW >= 2000gm).       Medications   Current Facility-Administered Medications   Medication     ampicillin 350 mg in NS injection PEDS/NICU     breast milk for bar code scanning verification 1 Bottle     gentamicin (PF) (GARAMYCIN) injection NICU 12 mg     [START ON 2018] lipids 20% for neonates (Daily dose divided into 2 doses - each infused over 10 hours)      Starter TPN - 5% amino acid (PREMASOL) in 10% Dextrose 150 mL     sucrose (SWEET-EASE) solution 0.2-2 mL          Physical Exam   Age at exam: 2 hours old  Enc Vitals  BP: 71/52  Resp: 56  Temp: 98  F (36.7  C)  Temp src: Axillary  SpO2: 96 %  Weight: 3.41 kg (7 lb 8.3 oz)  Head circ:  34.5cm  50%%ile   Length: 50.5cm   60%%ile   Weight: 3.41kg   55%ile     Facies:  No dysmorphic features.   Head: Normocephalic. Anterior fontanelle soft, scalp clear. Suturesapproximated  Ears: Pinnae normal. Canals present bilaterally.  Eyes: Red reflex bilaterally. No conjunctivitis.   Nose: Nares patent bilaterally.  Oropharynx: No cleft. Moist mucous membranes. No erythema or lesions.  Neck: Supple. No masses.  Clavicles: Normal without deformity or crepitus.  CV: Regular rate and rhythm. No murmur. Normal S1 and S2.  Peripheral/femoral pulses present, normal and symmetric. Extremities warm. Capillary refill < 3 seconds peripherally and centrally.    Lungs: Breath sounds clear with good aeration bilaterally. No retractions or nasal flaring.   Abdomen: Soft, non-tender, non-distended. No masses or hepatomegaly. Three vessel cord.  Back: Spine straight. Sacrum clear/intact, no dimple.   Male: Normal male genitalia. Testes descended bilaterally. No hypospadius.  Anus:  Normal position. Appears patent.   Extremities: Spontaneous movement of all four extremities.  Hips: Negative Ortolani. Negative Smith.  Neuro: Active. Normal  and Shirley reflexes. Normal suck. Tone normal and symmetric bilaterally. No focal deficits.  Skin: No jaundice. No rashes or skin breakdown.       Communication  Parents:  Updated on admission.    PCPs:  Infant PCP: To be discussed with family  Maternal OB PCP:     Delivering provider: Carlos Graf CNM  Admission note routed to all.    Health Care Team:  Patient discussed with the care team. A/P, imaging studies, laboratory data, medications and family situation reviewed.    Past Medical History   This patient has no significant past medical history       Family History -    This patient has no significant family history       Maternal History   (NOTE - see maternal data and prenatal history report to review, select from baby index report)       Social History -    This  has no significant social history       Allergies   All allergies reviewed and addressed       Review of Systems   Not applicable to this patient.          Physician Attestation   Admitting KEVAN:   DEEJAY Berumen, CNP      Attending Neonatologist:   Simba Negrete MD   Intact

## 2023-07-17 NOTE — OB RN INTRAOPERATIVE NOTE - NSSELHIDDEN_OBGYN_ALL_OB_FT
[NS_DeliveryAttending1_OBGYN_ALL_OB_FT:[id number]],[NS_DeliveryAssist1_OBGYN_ALL_OB_FT:[id number]] [NS_DeliveryAttending1_OBGYN_ALL_OB_FT:[id number]],[NS_DeliveryAssist1_OBGYN_ALL_OB_FT:[id number]=],[NS_DeliveryRN_OBGYN_ALL_OB_FT:[id number]]

## 2023-07-17 NOTE — OB RN PATIENT PROFILE - NSICDXPASTMEDICALHX_GEN_ALL_CORE_FT
PAST MEDICAL HISTORY:  Failure to progress in labor     History of  section     Normal vaginal delivery 2010 F 6#2   F 6#7

## 2023-07-17 NOTE — OB RN DELIVERY SUMMARY - AS DELIV COMPLICATIONS OB
Render In Strict Bullet Format?: No Detail Level: Detailed Plan: Change to Finacea gel qd-BID. (Azelaic acid from [facility] pharmacy) for more hydration and redness reduction. meconium stained fluid

## 2023-07-17 NOTE — OB PROVIDER H&P - NS_OBGYNHISTORY_OBGYN_ALL_OB_FT
: 9/17/10, , FT, 6#2   G2: 12, , FT, 6#7  G3: 13, pC/s, NRFHT, FT, 5#2  G4: 2016, rC/s, failed TOLAC, FT, 6#2  G5: 2019, rC/s, oligo, FT, 2740g  G6: current    Gyn Hx: Denies fibroids, ovarian cysts, abnormal pap smears

## 2023-07-17 NOTE — OB PROVIDER H&P - NSHPPHYSICALEXAM_GEN_ALL_CORE
ICU Vital Signs Last 24 Hrs  T(C): 36.6 (17 Jul 2023 08:49), Max: 36.6 (17 Jul 2023 08:36)  T(F): 97.88 (17 Jul 2023 08:49), Max: 97.9 (17 Jul 2023 08:36)  HR: 80 (17 Jul 2023 08:36) (80 - 80)  BP: 98/62 (17 Jul 2023 08:36) (98/62 - 98/62)  BP(mean): --  ABP: --  ABP(mean): --  RR: 14 (17 Jul 2023 08:36) (14 - 14)  SpO2: --    O2 Parameters below as of 17 Jul 2023 08:36  Patient On (Oxygen Delivery Method): room air    General: A&O x3  Heart: RRR, S1 & S2  Lungs: CTA b/l, good inspiratory/expiratory effort. No ronchi, no rales  Abdomen: Soft, Gravid, TOCO in place, +pfannenstial scar    EFM: baseline , moderate variability, +accels, + early decel, Category 2 with recovery to Category 1, nonreactive  TOCO: contractions noted, irregular    Extremities: FROM, bilateral 1+ LE edema  Neuro: grossly intact

## 2023-07-17 NOTE — OB RN DELIVERY SUMMARY - NS_SEPSISRSKCALC_OBGYN_ALL_OB_FT
EOS calculated successfully. EOS Risk Factor: 0.5/1000 live births ([facility] national incidence); GA=39w2d; Temp=97.9; ROM=0.017; GBS='Negative'; Antibiotics='No antibiotics or any antibiotics < 2 hrs prior to birth'

## 2023-07-17 NOTE — OB PROVIDER DELIVERY SUMMARY - NSSELHIDDEN_OBGYN_ALL_OB_FT
[NS_DeliveryAttending1_OBGYN_ALL_OB_FT:[id number]],[NS_DeliveryAssist1_OBGYN_ALL_OB_FT:[id number]=],[NS_DeliveryRN_OBGYN_ALL_OB_FT:[id number]=]

## 2023-07-17 NOTE — OB PROVIDER DELIVERY SUMMARY - NSPROVIDERDELIVERYNOTE_OBGYN_ALL_OB_FT
Procedure: rLTCS & BS  Preop Dx: previous C/S  QBL:317cc  IVF: 1.9cc crystalloids  UOP: 450cc  Layers of uterine closure: one  Complications: none  Specimen: BL fallopian tubes sent  Findings: thin lower uterine segment, dense adhesions from bladder to ISHAAN, dense scar tissue btwn peritoneum & rectus muscles, otherwise grossly normal uterus, BL fallopian tubes, BL ovaries  Hemostatic/intraoperative agents: surgicel powder  Baby: F infant, vtx presentation    David Rasmussen, PGY4 Procedure: rLTCS & BS  Preop Dx: previous C/S  QBL:317cc  IVF: 1.9cc crystalloids  UOP: 450cc  Layers of uterine closure: one  Complications: none  Specimen: BL fallopian tubes sent  Findings: thin lower uterine segment, dense adhesions from bladder to ISHAAN, dense scar tissue btwn peritoneum & rectus muscles, otherwise grossly normal uterus, BL fallopian tubes, BL ovaries  Hemostatic/intraoperative agents: surgicel powder  Baby: F infant, vtx presentation    David Rasmussen, PGY4    dictation#[id number]

## 2023-07-17 NOTE — OB PROVIDER H&P - NSICDXPASTSURGICALHX_GEN_ALL_CORE_FT
PAST SURGICAL HISTORY:  History of  section 2013 M [facility] 5#6  2016 F arrest of dilatation 6#2

## 2023-07-17 NOTE — OB PROVIDER H&P - NSHPSOCIALHISTORY_GEN_ALL_CORE
Lives at home with  and children, feels safe. Denies alcohol/tobacco/drug use during pregnancy. Denies history of Gonorrhea, Chlamydia, HIV, HSV, STIs.    Psych: Denies PPD, depression, anxiety

## 2023-07-17 NOTE — OB RN PATIENT PROFILE - FUNCTIONAL ASSESSMENT - BASIC MOBILITY 1.
Visited pt, new palliative care pt with family meeting scheduled for tomorrow; pt was sleeping, with BiPAP on; spoke to him kindly, reminded him he's loved and being well taken care of; provided compassionate presence; Mckenna says he's [employer], so I prayed over him; at the end of my prayer and laying my hands upon him, he opened his eyes; provided more comforting words.  will continue to follow; wrote my name//# on white board. Lord, in your mercy.   4 = No assist / stand by assistance

## 2023-07-17 NOTE — OB PROVIDER H&P - NS PANP COMMENT GEN_ALL_CORE FT
Pt seen.  Agree with above.  Pt presenting for RCS+BS.  Written consent obtained.    DEBORAH Henderson MD

## 2023-07-17 NOTE — DISCHARGE NOTE OB - NS MD DC FALL RISK RISK
For information on Fall & Injury Prevention, visit: [email or web address] OR  [email or web address] OR  https://www.cdc.gov/steadi/patient.html

## 2023-07-17 NOTE — OB PROVIDER H&P - ALERT: PERTINENT HISTORY
Patient desires tubal ligation/1st Trimester Sonogram/20 Week Level II Sonogram/BioPhysical Profile(s)/Follow up Sonogram for Growth/Non Invasive Prenatal Screen (NIPS)

## 2023-07-17 NOTE — OB PROVIDER H&P - HISTORY OF PRESENT ILLNESS
34yo female  RUCHI 23 presents @39.2 weeks for scheduled rLTCS and bilateral salpingectomy. Denies shortness of breath, fever, chills or cough.  Admits contraction since friday, irregular. Denies vaginal bleeding or leakage of fluids today. Reports positive fetal movement.    Antepartum Course: anatomy scan wnl, passed GCT, GBS negative 23, NIPT low risk   Prenatal labs:  -T&S: AB+  -Rubella: Immune  -Hep B: Nonreactive  -HIV: Nonreactive  -RPR: Negative  Ultrasounds: Last [facility] sonogram EFW 3146g (23)

## 2023-07-17 NOTE — DISCHARGE NOTE OB - CARE PROVIDER_API CALL
Shakeel العلي  Maternal/Fetal Medicine  [address], [address]  [place] [address]  Phone: [phone]  Fax: [phone]  Follow Up Time: 2 weeks

## 2023-07-18 LAB
BASOPHILS # BLD AUTO: 0.02 K/UL — SIGNIFICANT CHANGE UP (ref 0–0.2)
BASOPHILS NFR BLD AUTO: 0.2 % — SIGNIFICANT CHANGE UP (ref 0–2)
EOSINOPHIL # BLD AUTO: 0.01 K/UL — SIGNIFICANT CHANGE UP (ref 0–0.5)
EOSINOPHIL NFR BLD AUTO: 0.1 % — SIGNIFICANT CHANGE UP (ref 0–6)
HCT VFR BLD CALC: 33.3 % — LOW (ref 34.5–45)
HGB BLD-MCNC: 11.1 G/DL — LOW (ref 11.5–15.5)
IANC: 10.88 K/UL — HIGH (ref 1.8–7.4)
IMM GRANULOCYTES NFR BLD AUTO: 0.4 % — SIGNIFICANT CHANGE UP (ref 0–0.9)
LYMPHOCYTES # BLD AUTO: 1.61 K/UL — SIGNIFICANT CHANGE UP (ref 1–3.3)
LYMPHOCYTES # BLD AUTO: 12.2 % — LOW (ref 13–44)
MCHC RBC-ENTMCNC: 30.2 PG — SIGNIFICANT CHANGE UP (ref 27–34)
MCHC RBC-ENTMCNC: 33.3 GM/DL — SIGNIFICANT CHANGE UP (ref 32–36)
MCV RBC AUTO: 90.7 FL — SIGNIFICANT CHANGE UP (ref 80–100)
MONOCYTES # BLD AUTO: 0.63 K/UL — SIGNIFICANT CHANGE UP (ref 0–0.9)
MONOCYTES NFR BLD AUTO: 4.8 % — SIGNIFICANT CHANGE UP (ref 2–14)
NEUTROPHILS # BLD AUTO: 10.88 K/UL — HIGH (ref 1.8–7.4)
NEUTROPHILS NFR BLD AUTO: 82.3 % — HIGH (ref 43–77)
NRBC # BLD: 0 /100 WBCS — SIGNIFICANT CHANGE UP (ref 0–0)
NRBC # FLD: 0 K/UL — SIGNIFICANT CHANGE UP (ref 0–0)
PLATELET # BLD AUTO: 152 K/UL — SIGNIFICANT CHANGE UP (ref 150–400)
RBC # BLD: 3.67 M/UL — LOW (ref 3.8–5.2)
RBC # FLD: 13.5 % — SIGNIFICANT CHANGE UP (ref 10.3–14.5)
WBC # BLD: 13.2 K/UL — HIGH (ref 3.8–10.5)
WBC # FLD AUTO: 13.2 K/UL — HIGH (ref 3.8–10.5)

## 2023-07-18 RX ORDER — HEPARIN SODIUM 5000 [USP'U]/ML
5000 INJECTION INTRAVENOUS; SUBCUTANEOUS EVERY 12 HOURS
Refills: 0 | Status: DISCONTINUED | OUTPATIENT
Start: 2023-07-18 | End: 2023-07-19

## 2023-07-18 RX ORDER — IBUPROFEN 200 MG
600 TABLET ORAL EVERY 6 HOURS
Refills: 0 | Status: DISCONTINUED | OUTPATIENT
Start: 2023-07-18 | End: 2023-07-19

## 2023-07-18 RX ADMIN — Medication 975 MILLIGRAM(S): at 08:45

## 2023-07-18 RX ADMIN — SIMETHICONE 80 MILLIGRAM(S): 80 TABLET, CHEWABLE ORAL at 11:36

## 2023-07-18 RX ADMIN — SIMETHICONE 80 MILLIGRAM(S): 80 TABLET, CHEWABLE ORAL at 05:01

## 2023-07-18 RX ADMIN — Medication 975 MILLIGRAM(S): at 04:04

## 2023-07-18 RX ADMIN — Medication 975 MILLIGRAM(S): at 20:30

## 2023-07-18 RX ADMIN — HEPARIN SODIUM 5000 UNIT(S): 5000 INJECTION INTRAVENOUS; SUBCUTANEOUS at 05:00

## 2023-07-18 RX ADMIN — HEPARIN SODIUM 5000 UNIT(S): 5000 INJECTION INTRAVENOUS; SUBCUTANEOUS at 18:34

## 2023-07-18 RX ADMIN — Medication 975 MILLIGRAM(S): at 09:27

## 2023-07-18 RX ADMIN — Medication 975 MILLIGRAM(S): at 15:30

## 2023-07-18 RX ADMIN — Medication 30 MILLIGRAM(S): at 00:09

## 2023-07-18 RX ADMIN — Medication 30 MILLIGRAM(S): at 12:29

## 2023-07-18 RX ADMIN — Medication 30 MILLIGRAM(S): at 05:00

## 2023-07-18 RX ADMIN — MAGNESIUM HYDROXIDE 30 MILLILITER(S): 400 TABLET, CHEWABLE ORAL at 05:16

## 2023-07-18 RX ADMIN — Medication 975 MILLIGRAM(S): at 20:00

## 2023-07-18 RX ADMIN — Medication 30 MILLIGRAM(S): at 06:00

## 2023-07-18 RX ADMIN — Medication 975 MILLIGRAM(S): at 03:03

## 2023-07-18 RX ADMIN — Medication 30 MILLIGRAM(S): at 00:40

## 2023-07-18 RX ADMIN — Medication 600 MILLIGRAM(S): at 19:24

## 2023-07-18 RX ADMIN — SIMETHICONE 80 MILLIGRAM(S): 80 TABLET, CHEWABLE ORAL at 18:34

## 2023-07-18 RX ADMIN — Medication 600 MILLIGRAM(S): at 18:34

## 2023-07-18 RX ADMIN — Medication 30 MILLIGRAM(S): at 11:36

## 2023-07-18 RX ADMIN — Medication 975 MILLIGRAM(S): at 14:57

## 2023-07-18 NOTE — PROGRESS NOTE ADULT - SUBJECTIVE AND OBJECTIVE BOX
R1 Progress Note    Patient seen and examined at bedside, no acute overnight events. No acute complaints, pain well controlled. Patient is ambulating and tolerating regular diet. Has not yet passed flatus. Montanez is still in place. Denies CP, SOB, N/V, HA, blurred vision, epigastric pain.    Vital Signs Last 24 Hours  T(C): 36.6 (07-18-23 @ 05:00), Max: 36.7 (07-18-23 @ 01:00)  HR: 63 (07-18-23 @ 05:00) (54 - 80)  BP: 93/51 (07-18-23 @ 05:00) (85/49 - 111/97)  RR: 17 (07-18-23 @ 05:00) (12 - 20)  SpO2: 99% (07-18-23 @ 05:00) (98% - 100%)    I&O's Summary    17 Jul 2023 07:01  -  18 Jul 2023 07:00  --------------------------------------------------------  IN: 3200 mL / OUT: 2557 mL / NET: 643 mL        Physical Exam:  General: NAD  Abdomen: Soft, non-tender, non-distended, fundus firm  Incision: Pfannenstiel incision CDI, subcuticular suture closure  Pelvic: Lochia wnl    Labs:    Blood Type: AB Positive  Antibody Screen: --  RPR: Negative               11.1   13.20 )-----------( 152      ( 07-18 @ 05:00 )             33.3                12.5   8.34  )-----------( 151      ( 07-17 @ 08:25 )             36.7                11.3   8.49  )-----------( 179      ( 07-06 @ 18:10 )             33.9         MEDICATIONS  (STANDING):  acetaminophen     Tablet .. 975 milliGRAM(s) Oral <User Schedule>  diphtheria/tetanus/pertussis (acellular) Vaccine (Adacel) 0.5 milliLiter(s) IntraMuscular once  heparin   Injectable 5000 Unit(s) SubCutaneous every 8 hours  ibuprofen  Tablet. 600 milliGRAM(s) Oral every 6 hours  ketorolac   Injectable 30 milliGRAM(s) IV Push every 6 hours  lactated ringers. 1000 milliLiter(s) (75 mL/Hr) IV Continuous <Continuous>  oxytocin Infusion 333.333 milliUNIT(s)/Min (1000 mL/Hr) IV Continuous <Continuous>  oxytocin Infusion 333.333 milliUNIT(s)/Min (1000 mL/Hr) IV Continuous <Continuous>    MEDICATIONS  (PRN):  diphenhydrAMINE 25 milliGRAM(s) Oral every 6 hours PRN Pruritus  lanolin Ointment 1 Application(s) Topical every 6 hours PRN Sore Nipples  magnesium hydroxide Suspension 30 milliLiter(s) Oral two times a day PRN Constipation  oxyCODONE    IR 5 milliGRAM(s) Oral every 3 hours PRN Moderate to Severe Pain (4-10)  oxyCODONE    IR 5 milliGRAM(s) Oral once PRN Moderate to Severe Pain (4-10)  simethicone 80 milliGRAM(s) Chew every 4 hours PRN Gas

## 2023-07-18 NOTE — PROGRESS NOTE ADULT - SUBJECTIVE AND OBJECTIVE BOX
Day ___1 of Anesthesia Pain Management Service    SUBJECTIVE:  Pain Scale Score	At rest: __none_ 	With Activity: __mild_ 	[x ] Refer to charted pain scores    THERAPY:    s/p _____0.1__ mg PF morphine intrathecal    OBJECTIVE:    Sedation Score:	[ x] Alert	[ ] Drowsy	[ ] Arousable	[ ] Asleep	[ ] Unresponsive    Side Effects:	[x ] None	[ ] Nausea	[ ] Vomiting	[ ] Pruritus  		  [ ] Weakness		[ ] Numbness	[ ] Other:    Vital Signs Last 24 Hrs  T(C): 36.5 (18 Jul 2023 09:41), Max: 36.7 (18 Jul 2023 01:00)  T(F): 97.7 (18 Jul 2023 09:41), Max: 98 (18 Jul 2023 01:00)  HR: 68 (18 Jul 2023 09:41) (54 - 68)  BP: 97/79 (18 Jul 2023 09:41) (89/44 - 111/97)  BP(mean): 101 (17 Jul 2023 14:30) (56 - 101)  RR: 18 (18 Jul 2023 09:41) (12 - 20)  SpO2: 99% (18 Jul 2023 09:41) (98% - 100%)    Parameters below as of 18 Jul 2023 05:00  Patient On (Oxygen Delivery Method): room air        ASSESSMENT/ PLAN  [x ] Patient transitioned to prn analgesics  [ x] Pain management per primary service, pain service to sign off   [ x]Documentation and Verification of current medications     Comments: No anesthetic complications.

## 2023-07-18 NOTE — PROVIDER CONTACT NOTE (OTHER) - SITUATION
Pt complains of right neck pain that feels like pressure upon activity. pain is localized in that area, pt denies pain feels sharp or shooting down to the right arm

## 2023-07-18 NOTE — PROGRESS NOTE ADULT - ASSESSMENT
32y/o  POD#1 from uncomplicated rLTCS+BS. Patient is hemodynamically stable and progressing well post-op.    #Postpartum state  - Continue with po analgesia  - Increase ambulation  - Continue regular diet  - Check CBC  - Incision dressing removed  - DVT prophylaxis with 5000u heparin    Rebecca Pandya  PGY-1

## 2023-07-19 VITALS
HEART RATE: 67 BPM | OXYGEN SATURATION: 100 % | TEMPERATURE: 98 F | RESPIRATION RATE: 18 BRPM | SYSTOLIC BLOOD PRESSURE: 103 MMHG | DIASTOLIC BLOOD PRESSURE: 61 MMHG

## 2023-07-19 DIAGNOSIS — O34.211 MATERNAL CARE FOR LOW TRANSVERSE SCAR FROM PREVIOUS CESAREAN DELIVERY: ICD-10-CM

## 2023-07-19 PROBLEM — Z78.9 OTHER SPECIFIED HEALTH STATUS: Chronic | Status: ACTIVE | Noted: 2023-07-17

## 2023-07-19 RX ORDER — IBUPROFEN 200 MG
1 TABLET ORAL
Qty: 0 | Refills: 0 | DISCHARGE
Start: 2023-07-19

## 2023-07-19 RX ORDER — FERROUS SULFATE 325(65) MG
1 TABLET ORAL
Refills: 0 | DISCHARGE

## 2023-07-19 RX ADMIN — Medication 975 MILLIGRAM(S): at 15:22

## 2023-07-19 RX ADMIN — Medication 600 MILLIGRAM(S): at 06:08

## 2023-07-19 RX ADMIN — Medication 600 MILLIGRAM(S): at 06:38

## 2023-07-19 RX ADMIN — Medication 975 MILLIGRAM(S): at 10:13

## 2023-07-19 RX ADMIN — Medication 975 MILLIGRAM(S): at 14:52

## 2023-07-19 RX ADMIN — Medication 975 MILLIGRAM(S): at 04:15

## 2023-07-19 RX ADMIN — Medication 975 MILLIGRAM(S): at 03:40

## 2023-07-19 RX ADMIN — HEPARIN SODIUM 5000 UNIT(S): 5000 INJECTION INTRAVENOUS; SUBCUTANEOUS at 06:07

## 2023-07-19 RX ADMIN — Medication 600 MILLIGRAM(S): at 11:57

## 2023-07-19 RX ADMIN — Medication 600 MILLIGRAM(S): at 01:01

## 2023-07-19 RX ADMIN — Medication 600 MILLIGRAM(S): at 12:30

## 2023-07-19 RX ADMIN — Medication 600 MILLIGRAM(S): at 00:31

## 2023-07-19 RX ADMIN — Medication 975 MILLIGRAM(S): at 09:22

## 2023-07-19 NOTE — PROGRESS NOTE ADULT - SUBJECTIVE AND OBJECTIVE BOX
Postpartum Note,  Section  Post op Day 2    Subjective:  The patient feels well.  She is ambulating.   She is tolerating regular diet.  She denies nausea and vomiting.  She is voiding.  Her pain is controlled.  She reports normal postpartum bleeding.    Physical exam:    Vital Signs Last 24 Hrs  T(C): 36.6 (2023 06:40), Max: 36.7 (2023 14:00)  T(F): 97.8 (2023 06:40), Max: 98 (2023 14:00)  HR: 59 (2023 06:40) (59 - 74)  BP: 103/63 (2023 06:40) (103/63 - 122/72)  BP(mean): --  RR: 19 (2023 06:40) (17 - 19)  SpO2: 98% (2023 06:40) (98% - 100%)    Parameters below as of 2023 06:40  Patient On (Oxygen Delivery Method): room air        Gen: NAD    Abdomen: Soft, nontender, no distension , firm uterine fundus at umbilicus.  Incision: Clean, dry, and intact  Pelvic: Normal lochia noted  Ext: No calf tenderness    LABS:                        11.1   13.20 )-----------( 152      ( 2023 05:00 )             33.3       Rubella status:     Allergies    No Known Allergies    Intolerances      MEDICATIONS  (STANDING):  acetaminophen     Tablet .. 975 milliGRAM(s) Oral <User Schedule>  diphtheria/tetanus/pertussis (acellular) Vaccine (Adacel) 0.5 milliLiter(s) IntraMuscular once  heparin   Injectable 5000 Unit(s) SubCutaneous every 12 hours  ibuprofen  Tablet. 600 milliGRAM(s) Oral every 6 hours    MEDICATIONS  (PRN):  diphenhydrAMINE 25 milliGRAM(s) Oral every 6 hours PRN Pruritus  lanolin Ointment 1 Application(s) Topical every 6 hours PRN Sore Nipples  magnesium hydroxide Suspension 30 milliLiter(s) Oral two times a day PRN Constipation  oxyCODONE    IR 5 milliGRAM(s) Oral every 3 hours PRN Moderate to Severe Pain (4-10)  oxyCODONE    IR 5 milliGRAM(s) Oral once PRN Moderate to Severe Pain (4-10)  simethicone 80 milliGRAM(s) Chew every 4 hours PRN Gas        Assessment and Plan  POD # 2 s/p  section  Doing well.  Encourage ambulation.  DC home in am  FU 1 weeks  Instruction given  Questions answered

## 2023-07-19 NOTE — PROGRESS NOTE ADULT - SUBJECTIVE AND OBJECTIVE BOX
SUBJECTIVE:    Pain: Controlled    Complaints: None    MILESTONES:    Alert and Oriented x 3  [ x ]  Out of bed/ ambulating. [ x ]  Flatus:   Positive [ x ]  Negative [  ]  Bowel movement  [  ] Positive [  ] Negative   Voiding [x  ] Due to void [  ]   Montanez/Indwelling catheter in place [  ]  Diet: Regular [ x ]  Clears [  ]  NPO [  ]    Infant feeding:  Breast [  ]   Bottle [  ]  Both [ X ]  Feeding related issues and/or concerns:      OBJECTIVE:  T(C): 36.6 (23 @ 06:40), Max: 36.7 (23 @ 14:00)  HR: 59 (23 @ 06:40) (59 - 74)  BP: 103/63 (23 @ 06:40) (97/79 - 122/72)  RR: 19 (23 @ 06:40) (17 - 19)  SpO2: 98% (23 @ 06:40) (98% - 100%)  Wt(kg): --                        11.1   13.20 )-----------( 152      ( 2023 05:00 )             33.3           Blood Type: AB Positive    RPR: Negative          MEDICATIONS  (STANDING):  acetaminophen     Tablet .. 975 milliGRAM(s) Oral <User Schedule>  diphtheria/tetanus/pertussis (acellular) Vaccine (Adacel) 0.5 milliLiter(s) IntraMuscular once  heparin   Injectable 5000 Unit(s) SubCutaneous every 12 hours  ibuprofen  Tablet. 600 milliGRAM(s) Oral every 6 hours    MEDICATIONS  (PRN):  diphenhydrAMINE 25 milliGRAM(s) Oral every 6 hours PRN Pruritus  lanolin Ointment 1 Application(s) Topical every 6 hours PRN Sore Nipples  magnesium hydroxide Suspension 30 milliLiter(s) Oral two times a day PRN Constipation  oxyCODONE    IR 5 milliGRAM(s) Oral every 3 hours PRN Moderate to Severe Pain (4-10)  oxyCODONE    IR 5 milliGRAM(s) Oral once PRN Moderate to Severe Pain (4-10)  simethicone 80 milliGRAM(s) Chew every 4 hours PRN Gas        ASSESSMENT:    33y     G  6    P   6006      PO Day#  2        Delivery: Primary [  ]    Repeat [ X ]     QBL - 317                                       Indication of procedure: Scheduled    Condition: Stable    Past Medical History significant for: HPI:  32yo female  RUCHI 23 presents @39.2 weeks for scheduled rLTCS and bilateral salpingectomy. Denies shortness of breath, fever, chills or cough.  Admits contraction since friday, irregular. Denies vaginal bleeding or leakage of fluids today. Reports positive fetal movement.    Antepartum Course: anatomy scan wnl, passed GCT, GBS negative 23, NIPT low risk   Prenatal labs:  -T&S: AB+  -Rubella: Immune  -Hep B: Nonreactive  -HIV: Nonreactive  -RPR: Negative  Ultrasounds: Last MFM sonogram EFW 3146g (23)   (2023 08:54)      Current Issues:    Breasts:  Soft [x  ]   Engorged [  ]  Nipples:  Abdomen: Soft [ x ]   Distended [  ] Nontender [  ]     Bowel sounds :  Present [  ]  Absent [  ]   Fundus:  Firm [x  ]  Boggy [  ]    Abdominal incision: Clean, Dry and Intact [x  ]  Staples [  ] Steri Strips [ X ] Dermabond [  ] Sutures [  ]    Patient wearing abdominal binder for support.    Vaginal: Lochia:  Heavy [  ]  Moderate [ x ]   Scant [  ]    Extremities: Edema [ X ] Negative Bernardo's Sign [ X ] Nontender Karson  [ x ] Positive pedal pulses [  ]    Other relevant physical exam findings:      PLAN:    Plan: Increase ambulation, analgesia PRN and pain medication protocol standing oxycodone, ibuprofen and acetaminophen.    Diet: Regular diet    Continue routine post-operative and postpartum care.  The patient would like to go home today.    Discharge Planning [ x ]    For Discharge Today  [  X  ]    Consults:  Social Work [  ]  Lactation [ x ]  Other [         ]    SUBJECTIVE:    Pain: Controlled    Complaints: None    MILESTONES:    Alert and Oriented x 3  [ x ]  Out of bed/ ambulating. [ x ]  Flatus:   Positive [ x ]  Negative [  ]  Bowel movement  [ X ] Positive [  ] Negative   Voiding [x  ] Due to void [  ]   Montanez/Indwelling catheter in place [  ]  Diet: Regular [ x ]  Clears [  ]  NPO [  ]    Infant feeding:  Breast [  ]   Bottle [  ]  Both [ X ]  Feeding related issues and/or concerns:      OBJECTIVE:  T(C): 36.6 (23 @ 06:40), Max: 36.7 (23 @ 14:00)  HR: 59 (23 @ 06:40) (59 - 74)  BP: 103/63 (23 @ 06:40) (97/79 - 122/72)  RR: 19 (23 @ 06:40) (17 - 19)  SpO2: 98% (23 @ 06:40) (98% - 100%)  Wt(kg): --                        11.1   13.20 )-----------( 152      ( 2023 05:00 )             33.3           Blood Type: AB Positive    RPR: Negative          MEDICATIONS  (STANDING):  acetaminophen     Tablet .. 975 milliGRAM(s) Oral <User Schedule>  diphtheria/tetanus/pertussis (acellular) Vaccine (Adacel) 0.5 milliLiter(s) IntraMuscular once  heparin   Injectable 5000 Unit(s) SubCutaneous every 12 hours  ibuprofen  Tablet. 600 milliGRAM(s) Oral every 6 hours    MEDICATIONS  (PRN):  diphenhydrAMINE 25 milliGRAM(s) Oral every 6 hours PRN Pruritus  lanolin Ointment 1 Application(s) Topical every 6 hours PRN Sore Nipples  magnesium hydroxide Suspension 30 milliLiter(s) Oral two times a day PRN Constipation  oxyCODONE    IR 5 milliGRAM(s) Oral every 3 hours PRN Moderate to Severe Pain (4-10)  oxyCODONE    IR 5 milliGRAM(s) Oral once PRN Moderate to Severe Pain (4-10)  simethicone 80 milliGRAM(s) Chew every 4 hours PRN Gas        ASSESSMENT:    33y     G  6    P   6006      PO Day#  2        Delivery: Primary [  ]    Repeat [ X ]     QBL - 317                                       Indication of procedure: Scheduled    Condition: Stable    Past Medical History significant for: HPI:  34yo female  RUCHI 23 presents @39.2 weeks for scheduled rLTCS and bilateral salpingectomy. Denies shortness of breath, fever, chills or cough.  Admits contraction since friday, irregular. Denies vaginal bleeding or leakage of fluids today. Reports positive fetal movement.    Antepartum Course: anatomy scan wnl, passed GCT, GBS negative 23, NIPT low risk   Prenatal labs:  -T&S: AB+  -Rubella: Immune  -Hep B: Nonreactive  -HIV: Nonreactive  -RPR: Negative  Ultrasounds: Last MFM sonogram EFW 3146g (23)   (2023 08:54)      Current Issues:    Breasts:  Soft [x  ]   Engorged [  ]  Nipples:  Abdomen: Soft [ x ]   Distended [  ] Nontender [  ]     Bowel sounds :  Present [  ]  Absent [  ]   Fundus:  Firm [x  ]  Boggy [  ]    Abdominal incision: Clean, Dry and Intact [x  ]  Staples [  ] Steri Strips [ X ] Dermabond [  ] Sutures [  ]    Patient wearing abdominal binder for support.    Vaginal: Lochia:  Heavy [  ]  Moderate [ x ]   Scant [  ]    Extremities: Edema [ X ] Negative Bernardo's Sign [ X ] Nontender Karson  [ x ] Positive pedal pulses [  ]    Other relevant physical exam findings:      PLAN:    Plan: Increase ambulation, analgesia PRN and pain medication protocol standing oxycodone, ibuprofen and acetaminophen.    Diet: Regular diet    Continue routine post-operative and postpartum care.  The patient would like to go home today.    Discharge Planning [ x ]    For Discharge Today  [  X  ]    Consults:  Social Work [  ]  Lactation [ x ]  Other [         ]

## 2023-07-31 LAB — SURGICAL PATHOLOGY STUDY: SIGNIFICANT CHANGE UP

## 2023-08-01 ENCOUNTER — APPOINTMENT (OUTPATIENT)
Dept: OBGYN | Facility: CLINIC | Age: 33
End: 2023-08-01
Payer: COMMERCIAL

## 2023-08-01 VITALS
SYSTOLIC BLOOD PRESSURE: 100 MMHG | WEIGHT: 159 LBS | BODY MASS INDEX: 27.14 KG/M2 | DIASTOLIC BLOOD PRESSURE: 64 MMHG | HEIGHT: 64 IN

## 2023-08-01 PROCEDURE — 0503F POSTPARTUM CARE VISIT: CPT

## 2023-08-21 NOTE — HISTORY OF PRESENT ILLNESS
[Postpartum Follow Up] : postpartum follow up [Delivery Date: ___] : on [unfilled] [Repeat C/S] : delivered by  section (repeat) [Female] : Delivery History: baby girl [Wt. ___] : weighing [unfilled] [BTL] : bilateral tubal ligation completed [Breastfeeding] : currently nursing [Clean/Dry/Intact] : clean, dry and intact [Examination Of The Breasts] : breasts are normal [Doing Well] : is doing well [No Sign of Infection] : is showing no signs of infection [Excellent Pain Control] : has excellent pain control [Regressing] : is regressing [None] : None [Complications:___] : no complications [BF with Difficulty] : nursing without difficulty [FreeTextEntry8] : 2k incision check, doing well  [de-identified] : doing well  [FreeTextEntry1] : 33 year old F presenting for incision check. -incision healing well -f/u for 6 week PP visit

## 2023-09-19 ENCOUNTER — APPOINTMENT (OUTPATIENT)
Dept: OBGYN | Facility: CLINIC | Age: 33
End: 2023-09-19
Payer: COMMERCIAL

## 2023-09-19 VITALS
BODY MASS INDEX: 27.31 KG/M2 | HEIGHT: 64 IN | WEIGHT: 160 LBS | SYSTOLIC BLOOD PRESSURE: 100 MMHG | DIASTOLIC BLOOD PRESSURE: 62 MMHG

## 2023-09-19 PROCEDURE — 0503F POSTPARTUM CARE VISIT: CPT

## 2023-12-08 NOTE — OB NEONATOLOGY/PEDIATRICIAN DELIVERY SUMMARY - NSDELIVERYTYPEA_OBGYN_ALL_OB
Aftercare Plan:    Medication Management:  Dr. Miguel Ángel Gomez, [facility] ([facility]  [address] [place] [address], [address]  [place] [id number]  [phone]  Appointment on: Tuesday, February 13th at 3:30pm      Individual Therapy:  [facility]   [address], [address]  [place], [place] [id number] [phone]  Appointment on:       Delivery

## 2023-12-14 NOTE — OB PROVIDER H&P - ASSESSMENT
Jean Carloshart message sent to pt reviewing insulin dosing/timing 32yo  @ 39.2w  Dx: Scheduled rLTCS    - Admit to L&D  - NPO  - EFM/TOCO  - IV access, CBC/T&C/RPR  - Pepcid and Bicitra as per pre-op policy  - 2 units PRBCs  - Anesthesia consult   - Blood transfusion consent  - Operative Consent to be discussed and obtained by Dr. Vincent  - Plan to move to OR on time schedule  - Discussed with Dr. Beatriz Judge, PAC

## 2023-12-19 ENCOUNTER — APPOINTMENT (OUTPATIENT)
Dept: OBGYN | Facility: CLINIC | Age: 33
End: 2023-12-19

## 2025-01-03 NOTE — OB RN PREOPERATIVE CHECKLIST - HAIR REMOVAL
Patient called to let us know her breast MRI has been scheduled for 2/6/25. The MRI is for her high risk status. She is aware to have lab work prior, orders in chart. She asked if a medication can be sent to her pharmacy for anxiety. She uses [facility] on [address] in [place]. Ativan has worked well for her in the past. She will have a  the day of her breast MRI.     Will forward to JOSEMANUEL Castillo CNP to have prescription sent over.  
hair removal not indicated

## (undated) DEVICE — LIGASURE ATLAS 10MM 37CM